# Patient Record
Sex: FEMALE | Race: BLACK OR AFRICAN AMERICAN | NOT HISPANIC OR LATINO | Employment: UNEMPLOYED | URBAN - METROPOLITAN AREA
[De-identification: names, ages, dates, MRNs, and addresses within clinical notes are randomized per-mention and may not be internally consistent; named-entity substitution may affect disease eponyms.]

---

## 2017-07-24 ENCOUNTER — HOSPITAL ENCOUNTER (EMERGENCY)
Facility: HOSPITAL | Age: 27
Discharge: HOME | End: 2017-07-24
Attending: EMERGENCY MEDICINE
Payer: MEDICAID

## 2017-07-24 VITALS
BODY MASS INDEX: 26.68 KG/M2 | HEIGHT: 67 IN | SYSTOLIC BLOOD PRESSURE: 110 MMHG | HEART RATE: 90 BPM | WEIGHT: 170 LBS | OXYGEN SATURATION: 100 % | RESPIRATION RATE: 18 BRPM | TEMPERATURE: 98 F | DIASTOLIC BLOOD PRESSURE: 62 MMHG

## 2017-07-24 DIAGNOSIS — S40.012A CONTUSION OF LEFT SHOULDER, INITIAL ENCOUNTER: ICD-10-CM

## 2017-07-24 DIAGNOSIS — S09.90XA HEAD INJURY DUE TO TRAUMA, INITIAL ENCOUNTER: Primary | ICD-10-CM

## 2017-07-24 DIAGNOSIS — R51.9 OCCIPITAL HEADACHE: ICD-10-CM

## 2017-07-24 DIAGNOSIS — S20.229A BACK CONTUSION, UNSPECIFIED LATERALITY, INITIAL ENCOUNTER: ICD-10-CM
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
## 2017-07-24 LAB
B-HCG UR QL: NEGATIVE
CTP QC/QA: YES

## 2017-07-24 PROCEDURE — 25000003 PHARM REV CODE 250: Performed by: PHYSICIAN ASSISTANT

## 2017-07-24 PROCEDURE — 99285 EMERGENCY DEPT VISIT HI MDM: CPT | Mod: 25

## 2017-07-24 PROCEDURE — 81025 URINE PREGNANCY TEST: CPT | Performed by: PHYSICIAN ASSISTANT

## 2017-07-24 RX ORDER — ACETAMINOPHEN 325 MG/1
650 TABLET ORAL
Status: DISCONTINUED | OUTPATIENT
Start: 2017-07-24 | End: 2017-07-24

## 2017-07-24 RX ORDER — MELOXICAM 7.5 MG/1
7.5 TABLET ORAL DAILY
Qty: 12 TABLET | Refills: 0 | Status: SHIPPED | OUTPATIENT
Start: 2017-07-24 | End: 2018-02-13

## 2017-07-24 RX ORDER — HYDROCODONE BITARTRATE AND ACETAMINOPHEN 5; 325 MG/1; MG/1
1 TABLET ORAL
Status: COMPLETED | OUTPATIENT
Start: 2017-07-24 | End: 2017-07-24

## 2017-07-24 RX ORDER — ORPHENADRINE CITRATE 100 MG/1
100 TABLET, EXTENDED RELEASE ORAL 2 TIMES DAILY
Qty: 8 TABLET | Refills: 0 | Status: SHIPPED | OUTPATIENT
Start: 2017-07-24 | End: 2017-07-28

## 2017-07-24 RX ORDER — LIDOCAINE 50 MG/G
1 PATCH TOPICAL DAILY
Qty: 6 PATCH | Refills: 0 | Status: SHIPPED | OUTPATIENT
Start: 2017-07-24 | End: 2018-02-13

## 2017-07-24 RX ADMIN — HYDROCODONE BITARTRATE AND ACETAMINOPHEN 1 TABLET: 5; 325 TABLET ORAL at 02:07

## 2017-07-24 NOTE — ED TRIAGE NOTES
Patient c/o headache, neck, back, shoulder pain after tripping and falling down 13 steps at 430 am. Patient states she passed out for a minute.

## 2017-07-24 NOTE — ED PROVIDER NOTES
"Encounter Date: 7/24/2017    SCRIBE #1 NOTE: I, Naomi Jain, am scribing for, and in the presence of,  Brando Cuenca PA-C. I have scribed the following portions of the note - Other sections scribed: HPI/ROS.       History     Chief Complaint   Patient presents with    Fall     pt reports falling down flight of stairs "about 13 steps",at 0430, reports back pain 9/10, denies LOC     CC: Fall    HPI: This 27 y.o. Female with a medical history of asthma presents to the ED via EMS c/o severe (9/10), constant middle back pain, left shoulder pain, nausea, dizziness, generalized weakness, and possible LOC secondary to a fall that happened around 4:30 AM today. Patient reports that she was going up a flight of stairs, slipped, and fell backwards going down 13 wood steps. She notes that she does not remember what happened, only what her aunt told her. She then went to bed "not thinking anything of it," but woke up in pain later today. She notes that she hit the back of her head when falling. Back pain is exacerbated with palpation. Patient denies being pushed off steps. No attempted tx PTA. No alleviating factors reported. She also denies SOB, cough, visual disturbance, vomiting, and diarrhea as well as alcohol/drug use.       The history is provided by the patient. No  was used.     Review of patient's allergies indicates:  No Known Allergies  Past Medical History:   Diagnosis Date    Asthma      History reviewed. No pertinent surgical history.  History reviewed. No pertinent family history.  Social History   Substance Use Topics    Smoking status: Never Smoker    Smokeless tobacco: Never Used    Alcohol use No     Review of Systems   Constitutional: Negative for chills and fever.   HENT: Negative for ear pain.    Eyes: Negative for pain.   Respiratory: Negative for cough and shortness of breath.    Gastrointestinal: Positive for nausea. Negative for abdominal pain, diarrhea and vomiting. "   Genitourinary: Negative for difficulty urinating.   Musculoskeletal: Positive for back pain (middle). Negative for neck pain.        (+) Left Shoulder Pain   Skin: Negative for wound.   Neurological: Positive for dizziness and weakness (generalized).       Physical Exam     Initial Vitals [07/24/17 1306]   BP Pulse Resp Temp SpO2   (!) 110/51 94 18 98.5 °F (36.9 °C) 100 %      MAP       70.67         Physical Exam    Nursing note and vitals reviewed.  Constitutional: She appears well-developed and well-nourished. She is not diaphoretic. No distress.   HENT:   Head: Normocephalic and atraumatic. Head is without raccoon's eyes, without abrasion, without contusion and without laceration.   Right Ear: External ear normal. No hemotympanum.   Left Ear: External ear normal. No hemotympanum.   Nose: Nose normal. No nasal deformity.   Mouth/Throat: Oropharynx is clear and moist. No lacerations.   Eyes: Conjunctivae and EOM are normal. Right eye exhibits no discharge. Left eye exhibits no discharge.   Neck: Normal range of motion. No tracheal deviation present. No JVD present.   Cardiovascular: Normal rate, regular rhythm and normal heart sounds. Exam reveals no friction rub.    No murmur heard.  Pulmonary/Chest: Breath sounds normal. No stridor. No respiratory distress. She has no decreased breath sounds. She has no wheezes. She has no rhonchi. She has no rales. She exhibits no tenderness.   Abdominal: Soft. She exhibits no distension. There is no tenderness. There is no rigidity, no rebound and no guarding.   Musculoskeletal: Normal range of motion.   Diffuse tenderness to palpation of the entire neck and back, including to the midline spine and musculature.  However, there are no abnormal changes to the skin, including for abrasions, bruising, or lacerations.  No tenderness to the hips or right shoulder.  There is mild tenderness to palpation over the left before meals with limited range of motion of the left shoulder.   No tenderness or deformities to the joints.  Ambulating well, without limp.  No tenderness to the ribs.   Neurological: She is alert and oriented to person, place, and time.   Skin: Skin is warm and dry. No rash and no abscess noted. No erythema. No pallor.         ED Course   Procedures  Labs Reviewed   POCT URINE PREGNANCY             Medical Decision Making:   History:   Old Medical Records: I decided to obtain old medical records.    This is an emergent evaluation of a 27 y.o. female with no PMHx presenting to the ED for possible LOC s/p fall with back pain, HA, and L shoulder pain. Denies emesis, abdominal pain, and urinary retention. Vitals WNL, afebrile. Patient is non-toxic appearing and in no acute distress. Diffuse back TTP without visible evidence of trauma. Neurologically intact without TTP.     Given [place] in ED with improvement of symptoms. Remains well appearing. Xray of neck/spine and L shoulder shows no acute fracture/dislocation. CT head shows in intracranial hemorrhage or skull fracture. I doubt PTX. No abdominal TTP to suggest intraabdominal bleeding. Likely has soft tissue contusion.     Discharged home with supportive care. Head injury precautions given. Instructed to follow up with PCP for reevaluation and management of symptoms.     I discussed with the patient the diagnosis, treatment plan, indications for return to the emergency department, and for expected follow-up. The patient verbalized an understanding. The patient is asked if there are any questions or concerns. We discuss the case, until all issues are addressed to the patients satisfaction. Patient understands and is agreeable to the plan.     I discussed this patient with Dr. Russo who is in agreement with my assessment and plan.          Scribe Attestation:   Scribe #1: I performed the above scribed service and the documentation accurately describes the services I performed. I attest to the accuracy of the note.    Attending  Attestation:           Physician Attestation for Scribe:  Physician Attestation Statement for Scribe #1: I, Brando Cuenca PA-C, reviewed documentation, as scribed by Naomi Jain in my presence, and it is both accurate and complete.                 ED Course     Clinical Impression:   The primary encounter diagnosis was Head injury due to trauma, initial encounter. Diagnoses of Fall, Back contusion, unspecified laterality, initial encounter, Contusion of left shoulder, initial encounter, and Occipital headache were also pertinent to this visit.    Disposition:   Disposition: Discharged  Condition: Stable                        Brando Cuenca PA-C  07/24/17 1707

## 2017-08-08 ENCOUNTER — HOSPITAL ENCOUNTER (EMERGENCY)
Facility: HOSPITAL | Age: 27
Discharge: PSYCHIATRIC OR SUBSTANCE USE FACILITY | End: 2017-08-09
Attending: EMERGENCY MEDICINE
Payer: MEDICAID

## 2017-08-08 DIAGNOSIS — F39 MOOD DISORDER: Primary | ICD-10-CM

## 2017-08-08 DIAGNOSIS — F99 PSYCHIATRIC DIAGNOSIS: ICD-10-CM

## 2017-08-08 LAB
ALBUMIN SERPL BCP-MCNC: 3.5 G/DL
ALP SERPL-CCNC: 102 U/L
ALT SERPL W/O P-5'-P-CCNC: 24 U/L
AMORPH CRY URNS QL MICRO: ABNORMAL
AMPHET+METHAMPHET UR QL: NEGATIVE
ANION GAP SERPL CALC-SCNC: 11 MMOL/L
APAP SERPL-MCNC: <3 UG/ML
AST SERPL-CCNC: 37 U/L
B-HCG UR QL: NEGATIVE
BACTERIA #/AREA URNS HPF: ABNORMAL /HPF
BARBITURATES UR QL SCN>200 NG/ML: NEGATIVE
BASOPHILS # BLD AUTO: 0.04 K/UL
BASOPHILS NFR BLD: 0.3 %
BENZODIAZ UR QL SCN>200 NG/ML: NEGATIVE
BILIRUB SERPL-MCNC: 0.8 MG/DL
BILIRUB UR QL STRIP: ABNORMAL
BUN SERPL-MCNC: 8 MG/DL
BZE UR QL SCN: NEGATIVE
CALCIUM SERPL-MCNC: 9.3 MG/DL
CANNABINOIDS UR QL SCN: NEGATIVE
CHLORIDE SERPL-SCNC: 99 MMOL/L
CLARITY UR: ABNORMAL
CO2 SERPL-SCNC: 26 MMOL/L
COLOR UR: ABNORMAL
CREAT SERPL-MCNC: 0.7 MG/DL
CREAT UR-MCNC: 419.8 MG/DL
CTP QC/QA: YES
DIFFERENTIAL METHOD: ABNORMAL
EOSINOPHIL # BLD AUTO: 0.1 K/UL
EOSINOPHIL NFR BLD: 0.5 %
ERYTHROCYTE [DISTWIDTH] IN BLOOD BY AUTOMATED COUNT: 14.2 %
EST. GFR  (AFRICAN AMERICAN): >60 ML/MIN/1.73 M^2
EST. GFR  (NON AFRICAN AMERICAN): >60 ML/MIN/1.73 M^2
ETHANOL SERPL-MCNC: <10 MG/DL
GLUCOSE SERPL-MCNC: 94 MG/DL
GLUCOSE UR QL STRIP: ABNORMAL
HCT VFR BLD AUTO: 43.2 %
HGB BLD-MCNC: 14.1 G/DL
HGB UR QL STRIP: NEGATIVE
HYALINE CASTS #/AREA URNS LPF: 0 /LPF
KETONES UR QL STRIP: ABNORMAL
LEUKOCYTE ESTERASE UR QL STRIP: ABNORMAL
LYMPHOCYTES # BLD AUTO: 1.9 K/UL
LYMPHOCYTES NFR BLD: 16.6 %
MCH RBC QN AUTO: 29 PG
MCHC RBC AUTO-ENTMCNC: 32.6 G/DL
MCV RBC AUTO: 89 FL
METHADONE UR QL SCN>300 NG/ML: NEGATIVE
MICROSCOPIC COMMENT: ABNORMAL
MONOCYTES # BLD AUTO: 1 K/UL
MONOCYTES NFR BLD: 8.2 %
NEUTROPHILS # BLD AUTO: 8.7 K/UL
NEUTROPHILS NFR BLD: 74.4 %
NITRITE UR QL STRIP: NEGATIVE
NON-SQ EPI CELLS #/AREA URNS HPF: 1 /HPF
OPIATES UR QL SCN: NEGATIVE
PCP UR QL SCN>25 NG/ML: NEGATIVE
PH UR STRIP: 6 [PH] (ref 5–8)
PLATELET # BLD AUTO: 395 K/UL
PMV BLD AUTO: 9.2 FL
POTASSIUM SERPL-SCNC: 4.1 MMOL/L
PROT SERPL-MCNC: 8.5 G/DL
PROT UR QL STRIP: ABNORMAL
RBC # BLD AUTO: 4.87 M/UL
RBC #/AREA URNS HPF: 1 /HPF (ref 0–4)
SODIUM SERPL-SCNC: 136 MMOL/L
SP GR UR STRIP: 1.03 (ref 1–1.03)
SQUAMOUS #/AREA URNS HPF: 20 /HPF
TOXICOLOGY INFORMATION: ABNORMAL
TSH SERPL DL<=0.005 MIU/L-ACNC: 0.9 UIU/ML
URN SPEC COLLECT METH UR: ABNORMAL
UROBILINOGEN UR STRIP-ACNC: NEGATIVE EU/DL
WBC # BLD AUTO: 11.67 K/UL
WBC #/AREA URNS HPF: 3 /HPF (ref 0–5)

## 2017-08-08 PROCEDURE — 81000 URINALYSIS NONAUTO W/SCOPE: CPT

## 2017-08-08 PROCEDURE — 90792 PSYCH DIAG EVAL W/MED SRVCS: CPT | Mod: AF,HB,S$PBB, | Performed by: PSYCHIATRY & NEUROLOGY

## 2017-08-08 PROCEDURE — 85025 COMPLETE CBC W/AUTO DIFF WBC: CPT

## 2017-08-08 PROCEDURE — 81025 URINE PREGNANCY TEST: CPT | Performed by: EMERGENCY MEDICINE

## 2017-08-08 PROCEDURE — 99285 EMERGENCY DEPT VISIT HI MDM: CPT | Mod: 25

## 2017-08-08 PROCEDURE — 93010 ELECTROCARDIOGRAM REPORT: CPT | Mod: ,,, | Performed by: INTERNAL MEDICINE

## 2017-08-08 PROCEDURE — 80307 DRUG TEST PRSMV CHEM ANLYZR: CPT

## 2017-08-08 PROCEDURE — 84443 ASSAY THYROID STIM HORMONE: CPT

## 2017-08-08 PROCEDURE — 80329 ANALGESICS NON-OPIOID 1 OR 2: CPT

## 2017-08-08 PROCEDURE — 80320 DRUG SCREEN QUANTALCOHOLS: CPT

## 2017-08-08 PROCEDURE — 93005 ELECTROCARDIOGRAM TRACING: CPT

## 2017-08-08 PROCEDURE — 25000003 PHARM REV CODE 250: Performed by: EMERGENCY MEDICINE

## 2017-08-08 PROCEDURE — 80053 COMPREHEN METABOLIC PANEL: CPT

## 2017-08-08 RX ORDER — LORAZEPAM 0.5 MG/1
2 TABLET ORAL
Status: COMPLETED | OUTPATIENT
Start: 2017-08-08 | End: 2017-08-08

## 2017-08-08 RX ADMIN — LORAZEPAM 2 MG: 0.5 TABLET ORAL at 03:08

## 2017-08-08 NOTE — ED PROVIDER NOTES
"Encounter Date: 8/8/2017    SCRIBE #1 NOTE: I, Naomi Jain, am scribing for, and in the presence of,  Chucky Duran III, MD. I have scribed the following portions of the note - Other sections scribed: HPI/ROS.       History     Chief Complaint   Patient presents with    Anxiety     arrived [facility], called for c/o anxiety     CC: Anxiety    HPI: This 27 y.o. Female with a medical history of asthma presents to the ED via [facility] for an urgent evaluation of acute anxiety. Patient states that she woke up this morning feeling anxious with associated depression. She states that she feels "drained" and fatigue. Patient reports that she has always had things going on in her life, but there are "underlying things that make it worse" which is causing her stress. Patient is also c/o chest tightness ("can't breathe"), left arm numbness, and mild neck discomfort. No attempted tx for symptoms. No alleviating factors. Patient denies chest pain, fever, chills, and any other medical health issues.       The history is provided by the patient. No  was used.     Review of patient's allergies indicates:  No Known Allergies  Past Medical History:   Diagnosis Date    Asthma     Hx of psychiatric care     Psychiatric problem     Suicide attempt     Therapy      History reviewed. No pertinent surgical history.  History reviewed. No pertinent family history.  Social History   Substance Use Topics    Smoking status: Current Every Day Smoker     Packs/day: 1.00    Smokeless tobacco: Never Used    Alcohol use Yes      Comment: reports drinking daily states " it depends on how much"     Review of Systems   Constitutional: Positive for fatigue. Negative for chills and fever.   HENT: Negative for congestion, ear pain, rhinorrhea and sore throat.    Eyes: Negative for pain and visual disturbance.   Respiratory: Positive for chest tightness. Negative for cough and shortness of breath.    Cardiovascular: " Negative for chest pain.   Gastrointestinal: Negative for abdominal pain, diarrhea, nausea and vomiting.   Genitourinary: Negative for dysuria.   Musculoskeletal: Negative for back pain and neck pain.        (+) Neck discomfort   Skin: Negative for rash.   Neurological: Positive for numbness (left arm). Negative for weakness and headaches.   Psychiatric/Behavioral: The patient is nervous/anxious.        Physical Exam     Initial Vitals [08/08/17 1319]   BP Pulse Resp Temp SpO2   122/76 106 20 98.5 °F (36.9 °C) 100 %      MAP       91.33         Physical Exam    Nursing note and vitals reviewed.  Constitutional: She appears well-developed and well-nourished. She is not diaphoretic. No distress.   HENT:   Head: Normocephalic and atraumatic.   Nose: Nose normal.   Mouth/Throat: Oropharynx is clear and moist. No oropharyngeal exudate.   Eyes: Conjunctivae and EOM are normal. Pupils are equal, round, and reactive to light. No scleral icterus.   Neck: Normal range of motion. Neck supple. No thyromegaly present. No tracheal deviation present.   Cardiovascular: Normal rate, regular rhythm and normal heart sounds. Exam reveals no gallop and no friction rub.    No murmur heard.  Pulmonary/Chest: Breath sounds normal. No respiratory distress. She has no wheezes. She has no rhonchi. She has no rales.   Abdominal: Soft. Bowel sounds are normal. She exhibits no distension and no mass. There is no tenderness. There is no rebound and no guarding.   Musculoskeletal: Normal range of motion. She exhibits no edema or tenderness.   Lymphadenopathy:     She has no cervical adenopathy.   Neurological: She is alert and oriented to person, place, and time. She has normal strength. No cranial nerve deficit or sensory deficit.   Skin: Skin is warm and dry. No rash noted. No erythema. No pallor.   Psychiatric: Her behavior is normal.   Tearful         ED Course   Procedures  Labs Reviewed   CBC W/ AUTO DIFFERENTIAL - Abnormal; Notable for the  following:        Result Value    Platelets 395 (*)     Gran # 8.7 (*)     Gran% 74.4 (*)     Lymph% 16.6 (*)     All other components within normal limits   COMPREHENSIVE METABOLIC PANEL - Abnormal; Notable for the following:     Total Protein 8.5 (*)     All other components within normal limits   URINALYSIS - Abnormal; Notable for the following:     Appearance, UA Cloudy (*)     Protein, UA 2+ (*)     Glucose, UA 1+ (*)     Ketones, UA 2+ (*)     Bilirubin (UA) 1+ (*)     Leukocytes, UA Trace (*)     All other components within normal limits   DRUG SCREEN PANEL, URINE EMERGENCY - Abnormal; Notable for the following:     Creatinine, Random Ur 419.8 (*)     All other components within normal limits   ACETAMINOPHEN LEVEL - Abnormal; Notable for the following:     Acetaminophen (Tylenol), Serum <3.0 (*)     All other components within normal limits   URINALYSIS MICROSCOPIC - Abnormal; Notable for the following:     Bacteria, UA Few (*)     Non-Squam Epith 1 (*)     All other components within normal limits   TSH   ALCOHOL,MEDICAL (ETHANOL)   POCT URINE PREGNANCY             Medical Decision Making:   Initial Assessment:   27-year-old female presents for evaluation of anxiety/depression.  Patient's family members have called the emergency department and stated that the patient has been making homicidal and suicidal threats on Facebook, that she is been awake for 3 days.  On exam the patient is tearful but otherwise normal.   Differential Diagnosis:   Will obtain medical screening evaluation and plan for clearance for psychiatric evaluation and treatment.  ED Management:  Patient's workup is unremarkable.  Dr. Stewart has seen the patient and agrees with placement on a physician's emergency certificate.    At this time patient is cleared for psychiatric evaluation and treatment.            Scribe Attestation:   Scribe #1: I performed the above scribed service and the documentation accurately describes the services I  performed. I attest to the accuracy of the note.    Attending Attestation:           Physician Attestation for Scribe:  Physician Attestation Statement for Scribe #1: I, Chucky Duran III, MD, reviewed documentation, as scribed by Naomi Jain in my presence, and it is both accurate and complete.                 ED Course     Clinical Impression:   The primary encounter diagnosis was Mood disorder. A diagnosis of Psychiatric diagnosis was also pertinent to this visit.                           Chucky Duran III, MD  08/08/17 1884

## 2017-08-08 NOTE — CONSULTS
"[facility]  Psychiatry  Consult Note    Patient Name: Almaz Mora  MRN: [id number]   Code Status: No Order  Admission Date: 8/8/2017  Hospital Length of Stay: 0 days  Attending Physician: Chucky Duran III, MD  Primary Care Provider: [facility]    Current Legal Status: [facility]    Patient information was obtained from patient and ER records.   Consults  Subjective:     Principal Problem:<principal problem not specified>    Chief Complaint:  Mood disorder and anxiety     HPI: Patient Almaz Mora presents to the ED with chest pain and arm numbness and anxiety.  She states that she asked her aunt to call 911 to have her brought to the ED.  During our conversation, she is noted to be tearful, poor eye contact, and quiet.  She states that she has been very stressed at home lately with her aunt and that she drinks vodka daily (pint to a fifth) to "get away".  She states that she has been having thoughts of not living so aunt took her gun away.  Previous attempt of suicide by overdose and cutting at wrists over a year ago but never sought help or hospitalization.  Has been going to outpatient [facility] for "couple years" to see Dr. Naylor.  Not sure of which medications she has taken in the past or which medications she is taking now.  States that she is non-compliant "because none of them worked".  She is able to only recall trying Zoloft in the recent past.  No upcoming appointments.  States that she is depressed, sad, tearful, loss of interest, loss of motivation.  Passive thoughts of not being alive (no plans of suicide).  Worrisome person.  Panic attacks lately with increased alcohol use.  Denies manic or psychotic history but does feel that "reality isn't right".  Sleep has been poor past few days with restless sleep, pacing floors.  Denies alcohol withdrawals or seizures.  Aunt also made statement that patient made threatening comments on Facebook.  When told this to " "patient, she says, "now that makes me want to hurt her".  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    Hospital Course: No notes on file         Patient History           Medical as of 8/8/2017     Past Medical History Date Comments Source    Asthma -- -- Provider    Hx of psychiatric care -- -- Provider    Psychiatric problem -- -- Provider    Suicide attempt -- -- Provider    Therapy -- -- Provider    Pertinent Negatives Date Noted Comments Source    History of psychiatric hospitalization 8/8/2017 -- Provider            Surgical as of 8/8/2017    Past Surgical History: Patient provided no pertinent surgical history.           Family as of 8/8/2017    **None**           Tobacco Use as of 8/8/2017     Smoking Status Smoking Start Date Smoking Quit Date Packs/day Years Used    Current Every Day Smoker -- -- 1.00 --    Types Comments Smokeless Tobacco Status Smokeless Tobacco Quit Date Source    -- -- Never Used -- Provider            Alcohol Use as of 8/8/2017     Alcohol Use Drinks/Week Alcohol/Week Comments Source    Yes -- -- reports drinking daily states " it depends on how much" Provider            Drug Use as of 8/8/2017     Drug Use Types Frequency Comments Source    No -- -- -- Provider            Sexual Activity as of 8/8/2017     Sexually Active Birth Control Partners Comments Source    Not Currently -- -- -- Provider            Activities of Daily Living as of 8/8/2017     Activities of Daily Living Question Response Comments Source    Patient feels they ought to cut down on drinking/drug use Yes -- Provider    Patient annoyed by others criticizing their drinking/drug use No -- Provider    Patient has felt bad or guilty about drinking/drug use Yes -- Provider    Patient has had a drink/used drugs as an eye opener in the AM No -- Provider            Social Documentation as of 8/8/2017    **None**           Occupational as of 8/8/2017     Occupation Employer Comments Source    none - " "used to do makeup in the mall -- -- Provider            Socioeconomic as of 8/8/2017     Marital Status Spouse Name Number of Children Years Education Preferred Language Ethnicity Race Source    Single -- 0 -- English /Black Black or  Provider         Pertinent History Q A Comments    as of 8/8/2017 Lives with      Place in Birth Order      Lives in home with aunt    Number of Siblings      Raised by      Legal Involvement      Childhood Trauma      Criminal History of      Financial Status unemployed     Highest Level of Education high school graduation     Does patient have access to a firearm? Yes owns a gun     Service No     Primary Leisure Activity other "drink"    Spirituality non-practicing        Review of patient's allergies indicates:  No Known Allergies    No current facility-administered medications on file prior to encounter.      Current Outpatient Prescriptions on File Prior to Encounter   Medication Sig    lidocaine (LIDODERM) 5 % Place 1 patch onto the skin once daily. Remove & Discard patch within 12 hours or as directed by MD. May use 4% formulation if more affordable for patient.    meloxicam (MOBIC) 7.5 MG tablet Take 1 tablet (7.5 mg total) by mouth once daily.     Psychotherapeutics     None        Review of Systems   Constitutional: Positive for activity change and appetite change.   Respiratory: Negative for shortness of breath.    Cardiovascular: Negative for chest pain.   Gastrointestinal: Negative for diarrhea and nausea.   Musculoskeletal: Negative for myalgias.   Psychiatric/Behavioral: Positive for dysphoric mood and sleep disturbance.     Objective:     Vital Signs (Most Recent):  Temp: 98.5 °F (36.9 °C) (08/08/17 1319)  Pulse: 106 (08/08/17 1319)  Resp: 20 (08/08/17 1319)  BP: 122/76 (08/08/17 1319)  SpO2: 100 % (08/08/17 1319) Vital Signs (24h Range):  Temp:  [98.5 °F (36.9 °C)] 98.5 °F (36.9 °C)  Pulse:  [106] 106  Resp:  [20] 20  SpO2:  " "[100 %] 100 %  BP: (122)/(76) 122/76     Height: 5' 7" (170.2 cm)  Weight: 77.1 kg (170 lb)  Body mass index is 26.63 kg/m².    No intake or output data in the 24 hours ending 08/08/17 1558    Physical Exam   Psychiatric:   EXAMINATION    CONSTITUTIONAL  General Appearance: hospital attire    MUSCULOSKELETAL  Muscle Strength and Tone: normal  Abnormal Involuntary Movements: none noted  Gait and Station: not observed    PSYCHIATRIC MENTAL STATUS EXAM   Level of Consciousness: awake and alert but sleepy  Orientation: name, place, date, situation  Grooming: fair  Psychomotor Behavior: slowed  Speech: normal rate, soft tone, decreased volume  Language: no abnormalities  Mood: "not good"  Affect: blunted  Thought Process: linear  Associations: intact  Thought Content: threats of harm to others and passive thoughts of not living; denies psychosis  Memory: intact to recent and remote  Attention: diminished  Fund of Knowledge: intact to conversation  Insight: poor  Judgment: poor              Significant Labs:   Last 24 Hours:   Recent Lab Results       08/08/17  1547 08/08/17  1435      Acetaminophen (Tylenol), Serum  <3.0  Comment:  Toxic Levels:  Adults (4 hr post-ingestion).........>150 ug/mL  Adults (12 hr post-ingestion)........>40 ug/mL  Peds (2 hr post-ingestion, liquid)...>225 ug/mL  (L)     Albumin  3.5     Alcohol, Medical, Serum  <10     Alkaline Phosphatase  102     ALT  24     Anion Gap  11     AST  37     Baso #  0.04     Basophil%  0.3     Total Bilirubin  0.8  Comment:  For infants and newborns, interpretation of results should be based  on gestational age, weight and in agreement with clinical  observations.  Premature Infant recommended reference ranges:  Up to 24 hours.............<8.0 mg/dL  Up to 48 hours............<12.0 mg/dL  3-5 days..................<15.0 mg/dL  6-29 days.................<15.0 mg/dL       BUN, Bld  8     Calcium  9.3     Chloride  99     CO2  26     Creatinine  0.7     " Differential Method  Automated     eGFR if   >60     eGFR if non   >60  Comment:  Calculation used to obtain the estimated glomerular filtration  rate (eGFR) is the CKD-EPI equation. Since race is unknown   in our information system, the eGFR values for   -American and Non--American patients are given   for each creatinine result.       Eos #  0.1     Eosinophil%  0.5     Glucose  94     Gran #  8.7(H)     Gran%  74.4(H)     Hematocrit  43.2     Hemoglobin  14.1     Lymph #  1.9     Lymph%  16.6(L)     MCH  29.0     MCHC  32.6     MCV  89     Mono #  1.0     Mono%  8.2     MPV  9.2     Platelets  395(H)     Potassium  4.1     Preg Test, Ur Negative      Total Protein  8.5(H)      Acceptable Yes      RBC  4.87     RDW  14.2     Sodium  136     TSH  0.902     WBC  11.67         All pertinent labs within the past 24 hours have been reviewed.    Significant Imaging: I have reviewed all pertinent imaging results/findings within the past 24 hours.    Assessment/Plan:     Mood disorder    Unable to get full history but likely an underlying mood disorder (major depression vs substance induced).  Regardless, patient is gravely disabled with thought of harm to family and thoughts of not being alive.  Agree with PEC and 1:1 sitter.  Notify  of commitment process.  Seek acute inpatient psychiatric facility when medically cleared.  Monitor for alcohol withdrawal but not likely.  Once she gets some rest and feels comfortable, she may be more open and disclose which stressors are affecting her.  For now, treatment for depression and anxiety is warranted.  Would do best if we could get a list of meds that she has taken.  Would recommend to start with SSRI, likely citalopram would be a good choice.  If not, may do well with venlafaxine ER.  Also, inpatient psych facility will need to coordinate with aunt that she does not have access to the gun.  Utilize lorazepam  1mg every 4 hours PRN acute non-redirectable agitation, in case it is related to her alcohol use or possible withdrawal.             Kalen Stewart MD   Psychiatry  [facility]

## 2017-08-08 NOTE — ED NOTES
DANIEL CALLED AND FAXED TO [facility] 'S OFFICE AT 3:15 PM  SPOKE WITH ROMMEL

## 2017-08-08 NOTE — ED TRIAGE NOTES
"Pt is a 28 y/o  female who arrived via [facility] for complaints of left arm pain and chest tightness. Pt reports the symptoms began " a couple of hours ago". Pt rates the pain as 8/10 at this time. Pt reports nonproductive cough that began earlier. Pt denies taking any medication to relieve the symptoms. Pt reports feeling anxious and states " I have never felt like this before". Pt denies SI or HI at present.   "

## 2017-08-08 NOTE — SUBJECTIVE & OBJECTIVE
"     Patient History           Medical as of 8/8/2017     Past Medical History Date Comments Source    Asthma -- -- Provider    Hx of psychiatric care -- -- Provider    Psychiatric problem -- -- Provider    Suicide attempt -- -- Provider    Therapy -- -- Provider    Pertinent Negatives Date Noted Comments Source    History of psychiatric hospitalization 8/8/2017 -- Provider            Surgical as of 8/8/2017    Past Surgical History: Patient provided no pertinent surgical history.           Family as of 8/8/2017    **None**           Tobacco Use as of 8/8/2017     Smoking Status Smoking Start Date Smoking Quit Date Packs/day Years Used    Current Every Day Smoker -- -- 1.00 --    Types Comments Smokeless Tobacco Status Smokeless Tobacco Quit Date Source    -- -- Never Used -- Provider            Alcohol Use as of 8/8/2017     Alcohol Use Drinks/Week Alcohol/Week Comments Source    Yes -- -- reports drinking daily states " it depends on how much" Provider            Drug Use as of 8/8/2017     Drug Use Types Frequency Comments Source    No -- -- -- Provider            Sexual Activity as of 8/8/2017     Sexually Active Birth Control Partners Comments Source    Not Currently -- -- -- Provider            Activities of Daily Living as of 8/8/2017     Activities of Daily Living Question Response Comments Source    Patient feels they ought to cut down on drinking/drug use Yes -- Provider    Patient annoyed by others criticizing their drinking/drug use No -- Provider    Patient has felt bad or guilty about drinking/drug use Yes -- Provider    Patient has had a drink/used drugs as an eye opener in the AM No -- Provider            Social Documentation as of 8/8/2017    **None**           Occupational as of 8/8/2017     Occupation Employer Comments Source    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 Yes owns a gun     Service No     Primary Leisure Activity other "drink"    Spirituality non-practicing        Review of patient's allergies indicates:  No Known Allergies    No current facility-administered medications on file prior to encounter.      Current Outpatient Prescriptions on File Prior to Encounter   Medication Sig    lidocaine (LIDODERM) 5 % Place 1 patch onto the skin once daily. Remove & Discard patch within 12 hours or as directed by MD. May use 4% formulation if more affordable for patient.    meloxicam (MOBIC) 7.5 MG tablet Take 1 tablet (7.5 mg total) by mouth once daily.     Psychotherapeutics     None        Review of Systems   Constitutional: Positive for activity change and appetite change.   Respiratory: Negative for shortness of breath.    Cardiovascular: Negative for chest pain.   Gastrointestinal: Negative for diarrhea and nausea.   Musculoskeletal: Negative for myalgias.   Psychiatric/Behavioral: Positive for dysphoric mood and sleep disturbance.     Objective:     Vital Signs (Most Recent):  Temp: 98.5 °F (36.9 °C) (08/08/17 1319)  Pulse: 106 (08/08/17 1319)  Resp: 20 (08/08/17 1319)  BP: 122/76 (08/08/17 1319)  SpO2: 100 % (08/08/17 1319) Vital Signs (24h Range):  Temp:  [98.5 °F (36.9 °C)] 98.5 °F (36.9 °C)  Pulse:  [106] 106  Resp:  [20] 20  SpO2:  [100 %] 100 %  BP: (122)/(76) 122/76     Height: 5' 7" (170.2 cm)  Weight: 77.1 kg (170 lb)  Body mass index is 26.63 kg/m².    No intake or output data in the 24 " "hours ending 08/08/17 1558    Physical Exam   Psychiatric:   EXAMINATION    CONSTITUTIONAL  General Appearance: hospital attire    MUSCULOSKELETAL  Muscle Strength and Tone: normal  Abnormal Involuntary Movements: none noted  Gait and Station: not observed    PSYCHIATRIC MENTAL STATUS EXAM   Level of Consciousness: awake and alert but sleepy  Orientation: name, place, date, situation  Grooming: fair  Psychomotor Behavior: slowed  Speech: normal rate, soft tone, decreased volume  Language: no abnormalities  Mood: "not good"  Affect: blunted  Thought Process: linear  Associations: intact  Thought Content: threats of harm to others and passive thoughts of not living; denies psychosis  Memory: intact to recent and remote  Attention: diminished  Fund of Knowledge: intact to conversation  Insight: poor  Judgment: poor              Significant Labs:   Last 24 Hours:   Recent Lab Results       08/08/17  1547 08/08/17  1435      Acetaminophen (Tylenol), Serum  <3.0  Comment:  Toxic Levels:  Adults (4 hr post-ingestion).........>150 ug/mL  Adults (12 hr post-ingestion)........>40 ug/mL  Peds (2 hr post-ingestion, liquid)...>225 ug/mL  (L)     Albumin  3.5     Alcohol, Medical, Serum  <10     Alkaline Phosphatase  102     ALT  24     Anion Gap  11     AST  37     Baso #  0.04     Basophil%  0.3     Total Bilirubin  0.8  Comment:  For infants and newborns, interpretation of results should be based  on gestational age, weight and in agreement with clinical  observations.  Premature Infant recommended reference ranges:  Up to 24 hours.............<8.0 mg/dL  Up to 48 hours............<12.0 mg/dL  3-5 days..................<15.0 mg/dL  6-29 days.................<15.0 mg/dL       BUN, Bld  8     Calcium  9.3     Chloride  99     CO2  26     Creatinine  0.7     Differential Method  Automated     eGFR if   >60     eGFR if non   >60  Comment:  Calculation used to obtain the estimated glomerular " filtration  rate (eGFR) is the CKD-EPI equation. Since race is unknown   in our information system, the eGFR values for   -American and Non--American patients are given   for each creatinine result.       Eos #  0.1     Eosinophil%  0.5     Glucose  94     Gran #  8.7(H)     Gran%  74.4(H)     Hematocrit  43.2     Hemoglobin  14.1     Lymph #  1.9     Lymph%  16.6(L)     MCH  29.0     MCHC  32.6     MCV  89     Mono #  1.0     Mono%  8.2     MPV  9.2     Platelets  395(H)     Potassium  4.1     Preg Test, Ur Negative      Total Protein  8.5(H)      Acceptable Yes      RBC  4.87     RDW  14.2     Sodium  136     TSH  0.902     WBC  11.67         All pertinent labs within the past 24 hours have been reviewed.    Significant Imaging: I have reviewed all pertinent imaging results/findings within the past 24 hours.

## 2017-08-08 NOTE — ED NOTES
Sitter has direct visualization of patient with door open, patient has been changed into paper scrubs and personal belongings have been removed and placed in a patient belonging bag.

## 2017-08-08 NOTE — ED NOTES
Patient's mother called stated her daughter has not slept in 3 days. She has  not been taking her meds and has been very paranoid.mother states patient has been calling police making false reports. Mother states she is homicidal been posting statements on face book. Mother ask that we call her or the Aunt that patients lives with if staff needs anymore information regarding patient's behavior.Mother Kuoxo-[phone] or Aunt Swiras-[phone]

## 2017-08-08 NOTE — ASSESSMENT & PLAN NOTE
Unable to get full history but likely an underlying mood disorder (major depression vs substance induced).  Regardless, patient is gravely disabled with thought of harm to family and thoughts of not being alive.  Agree with [facility] and 1:1 sitter.  Notify  of commitment process.  Seek acute inpatient psychiatric facility when medically cleared.  Monitor for alcohol withdrawal but not likely.  Once she gets some rest and feels comfortable, she may be more open and disclose which stressors are affecting her.  For now, treatment for depression and anxiety is warranted.  Would do best if we could get a list of meds that she has taken.  Would recommend to start with SSRI, likely citalopram would be a good choice.  If not, may do well with venlafaxine ER.  Also, inpatient psych facility will need to coordinate with aunt that she does not have access to the gun.  Utilize lorazepam 1mg every 4 hours PRN acute non-redirectable agitation, in case it is related to her alcohol use or possible withdrawal.

## 2017-08-09 VITALS
HEART RATE: 96 BPM | BODY MASS INDEX: 26.68 KG/M2 | DIASTOLIC BLOOD PRESSURE: 67 MMHG | RESPIRATION RATE: 16 BRPM | SYSTOLIC BLOOD PRESSURE: 116 MMHG | OXYGEN SATURATION: 100 % | WEIGHT: 170 LBS | TEMPERATURE: 98 F | HEIGHT: 67 IN

## 2017-10-11 ENCOUNTER — HOSPITAL ENCOUNTER (EMERGENCY)
Facility: HOSPITAL | Age: 27
Discharge: HOME | End: 2017-10-11
Attending: EMERGENCY MEDICINE

## 2017-10-11 VITALS
HEIGHT: 67 IN | WEIGHT: 170 LBS | SYSTOLIC BLOOD PRESSURE: 105 MMHG | DIASTOLIC BLOOD PRESSURE: 64 MMHG | HEART RATE: 92 BPM | TEMPERATURE: 98 F | RESPIRATION RATE: 18 BRPM | BODY MASS INDEX: 26.68 KG/M2 | OXYGEN SATURATION: 99 %

## 2017-10-11 DIAGNOSIS — T50.901A ACCIDENTAL DRUG OVERDOSE, INITIAL ENCOUNTER: Primary | ICD-10-CM

## 2017-10-11 PROCEDURE — 99282 EMERGENCY DEPT VISIT SF MDM: CPT

## 2017-10-11 RX ORDER — QUETIAPINE FUMARATE 100 MG/1
TABLET, FILM COATED ORAL
COMMUNITY
End: 2019-01-17 | Stop reason: ALTCHOICE

## 2017-10-11 NOTE — ED TRIAGE NOTES
"Pt arrived via [facility] from home. CC of psych evaluation. Pt stated "I was on the phone with my cousin and I had taken 3 Seroquel and she just called 911." Pt presents drowsy, stated that she had "a few" drinks last night, pt denies SI, but reports she has had SI in the past and is dx with bipolar. NAD at this time.  "

## 2017-10-11 NOTE — ED PROVIDER NOTES
Encounter Date: 10/11/2017    SCRIBE #1 NOTE: I, Priya Hoover, am scribing for, and in the presence of,  Chucky Duran III, MD. I have scribed the following portions of the note - Other sections scribed: HPI and ROS.       History     Chief Complaint   Patient presents with    Psychiatric Evaluation     arrived via [facility], called to home by pt's cousin, not on scene, reports pt took 4-5 seroquel last night and hinted on possbile SIEDLY pt denies SI/HI     CC: Psychiatric Evaluation    HPI: This 27 y.o. Female who has asthma, bipolar 1 disorder, and previous suicide attempt presents to the ED for a psychiatric evaluation.  Patient reports she was sent to the ED after her cousin became concerned after she stated she took an extra dose of her medication.  Patient reports taking a 100 mg tablet of Seroquel in attempts to obtain sleep.  She reports taking an extra dose of the Seroquel after she felt the initial dose was not adequate.  She states she was informed by her doctor that she can increase her dose if needed.  Patient reports she had no intention of harming herself.  Patient denies fever, chills, nausea, emesis, diarrhea, abdominal pain, chest pain, back pain, shortness of breath, suicidal ideation, or any other associated symptoms. No prior tx.  No alleviating factors.      The history is provided by the patient. No  was used.     Review of patient's allergies indicates:  No Known Allergies  Past Medical History:   Diagnosis Date    Asthma     Bipolar 1 disorder     Hx of psychiatric care     Psychiatric problem     Suicide attempt     Therapy      History reviewed. No pertinent surgical history.  History reviewed. No pertinent family history.  Social History   Substance Use Topics    Smoking status: Current Every Day Smoker     Packs/day: 1.00    Smokeless tobacco: Never Used    Alcohol use Yes      Comment: occassionally     Review of Systems   Constitutional: Negative for  chills and fever.   HENT: Negative for ear pain and sore throat.    Eyes: Negative for pain.   Respiratory: Negative for cough and shortness of breath.    Cardiovascular: Negative for chest pain.   Gastrointestinal: Negative for abdominal pain, diarrhea, nausea and vomiting.   Genitourinary: Negative for dysuria.   Musculoskeletal: Negative for back pain.        (-) arm or leg problems   Skin: Negative for rash.   Neurological: Negative for headaches.   Psychiatric/Behavioral: Negative for suicidal ideas.       Physical Exam     Initial Vitals [10/11/17 1014]   BP Pulse Resp Temp SpO2   102/63 102 18 98.1 °F (36.7 °C) 98 %      MAP       76         Physical Exam    Nursing note and vitals reviewed.  Constitutional: She appears well-developed and well-nourished. She is not diaphoretic. No distress.   HENT:   Head: Normocephalic and atraumatic.   Nose: Nose normal.   Mouth/Throat: Oropharynx is clear and moist. No oropharyngeal exudate.   Eyes: Conjunctivae and EOM are normal. Pupils are equal, round, and reactive to light. No scleral icterus.   Neck: Normal range of motion. Neck supple. No thyromegaly present. No tracheal deviation present.   Cardiovascular: Normal rate, regular rhythm and normal heart sounds. Exam reveals no gallop and no friction rub.    No murmur heard.  Pulmonary/Chest: Breath sounds normal. No respiratory distress. She has no wheezes. She has no rhonchi. She has no rales.   Abdominal: Soft. Bowel sounds are normal. She exhibits no distension and no mass. There is no tenderness. There is no rebound and no guarding.   Musculoskeletal: Normal range of motion. She exhibits no edema or tenderness.   Lymphadenopathy:     She has no cervical adenopathy.   Neurological: She is alert and oriented to person, place, and time. She has normal strength. No cranial nerve deficit or sensory deficit.   Skin: Skin is warm and dry. No rash noted. No erythema. No pallor.   Psychiatric: She has a normal mood and  affect. Her behavior is normal. Thought content normal.         ED Course   Procedures  Labs Reviewed   POCT URINE PREGNANCY             Medical Decision Making:   Initial Assessment:   27-year-old female with a history of bipolar disorder presents after taking an extra dose of Seroquel, with other details as above.  Physical examination essentially unremarkable.  Patient has no evidence of untoward effect of this double dosing.  She is smiling, with normal affect, no evidence of suicidal ideation.  She is hopeful, has good coping mechanisms and a good support system.  I do not believe she is truly a risk to herself in the colon I do not believe she requires placement any physicians emergency certificate for emergent psychiatric evaluation and treatment.            Scribe Attestation:   Scribe #1: I performed the above scribed service and the documentation accurately describes the services I performed. I attest to the accuracy of the note.    Attending Attestation:           Physician Attestation for Scribe:  Physician Attestation Statement for Scribe #1: I, Chucky Duran III, MD, reviewed documentation, as scribed by Priya Hoover in my presence, and it is both accurate and complete.                 ED Course      Clinical Impression:   The encounter diagnosis was Accidental drug overdose, initial encounter.                           Chucky Duran III, MD  10/18/17 0636

## 2017-10-11 NOTE — DISCHARGE INSTRUCTIONS
Please return to the emergency department if you develop worsening sleepiness, vomiting, severe dizziness, severe depression, severe anxiety, or for any new or worsening medical concerns.

## 2018-02-13 ENCOUNTER — HOSPITAL ENCOUNTER (EMERGENCY)
Facility: HOSPITAL | Age: 28
Discharge: HOME | End: 2018-02-14
Attending: EMERGENCY MEDICINE
Payer: MEDICAID

## 2018-02-13 DIAGNOSIS — N76.0 BV (BACTERIAL VAGINOSIS): Primary | ICD-10-CM

## 2018-02-13 DIAGNOSIS — B96.89 BV (BACTERIAL VAGINOSIS): Primary | ICD-10-CM

## 2018-02-13 DIAGNOSIS — R10.2 SUPRAPUBIC PAIN: ICD-10-CM

## 2018-02-13 LAB
B-HCG UR QL: NEGATIVE
BILIRUB UR QL STRIP: NEGATIVE
CLARITY UR: CLEAR
COLOR UR: NORMAL
CTP QC/QA: YES
GLUCOSE UR QL STRIP: NEGATIVE
HGB UR QL STRIP: NEGATIVE
KETONES UR QL STRIP: NEGATIVE
LEUKOCYTE ESTERASE UR QL STRIP: NEGATIVE
NITRITE UR QL STRIP: NEGATIVE
PH UR STRIP: 6 [PH] (ref 5–8)
PROT UR QL STRIP: NEGATIVE
SP GR UR STRIP: 1 (ref 1–1.03)
URN SPEC COLLECT METH UR: NORMAL
UROBILINOGEN UR STRIP-ACNC: NEGATIVE EU/DL

## 2018-02-13 PROCEDURE — 81003 URINALYSIS AUTO W/O SCOPE: CPT

## 2018-02-13 PROCEDURE — 99284 EMERGENCY DEPT VISIT MOD MDM: CPT | Mod: 25

## 2018-02-13 PROCEDURE — 81025 URINE PREGNANCY TEST: CPT | Performed by: PHYSICIAN ASSISTANT

## 2018-02-14 VITALS
OXYGEN SATURATION: 97 % | DIASTOLIC BLOOD PRESSURE: 77 MMHG | RESPIRATION RATE: 16 BRPM | HEIGHT: 67 IN | TEMPERATURE: 98 F | BODY MASS INDEX: 25.11 KG/M2 | HEART RATE: 84 BPM | SYSTOLIC BLOOD PRESSURE: 122 MMHG | WEIGHT: 160 LBS

## 2018-02-14 LAB
BACTERIA GENITAL QL WET PREP: ABNORMAL
C TRACH DNA SPEC QL NAA+PROBE: NOT DETECTED
CLUE CELLS VAG QL WET PREP: ABNORMAL
FILAMENT FUNGI VAG WET PREP-#/AREA: ABNORMAL
N GONORRHOEA DNA SPEC QL NAA+PROBE: NOT DETECTED
SPECIMEN SOURCE: ABNORMAL
T VAGINALIS GENITAL QL WET PREP: ABNORMAL
WBC #/AREA VAG WET PREP: ABNORMAL
YEAST GENITAL QL WET PREP: ABNORMAL

## 2018-02-14 PROCEDURE — 87210 SMEAR WET MOUNT SALINE/INK: CPT

## 2018-02-14 PROCEDURE — 87491 CHLMYD TRACH DNA AMP PROBE: CPT

## 2018-02-14 RX ORDER — METRONIDAZOLE 500 MG/1
500 TABLET ORAL EVERY 12 HOURS
Qty: 14 TABLET | Refills: 0 | Status: SHIPPED | OUTPATIENT
Start: 2018-02-14 | End: 2018-02-21

## 2018-02-14 NOTE — ED TRIAGE NOTES
Pt. Reports having abdominal pain for the last 3 days, pt. Reports the sensation feels like cramping, but not related to her menstruation because period just passed. Pt. Reports pain in the lower epigastric area, pt. Denies vomiting and diarrhea complains of some nausea.

## 2018-02-14 NOTE — ED PROVIDER NOTES
Encounter Date: 2/13/2018    SCRIBE #1 NOTE: I, Jareth Cr, am scribing for, and in the presence of, Sarmad Moreno NP. Other sections scribed: HPI, ROS.       History     Chief Complaint   Patient presents with    abdominal pain     lower abd pain x 2 days; today started with dysuria and does states she has a vaginal discharge (green and white);      CC: Abdominal Pain  HPI: This 27 y.o. female smoker with Hx of Bipolar 1 disorder and asthma presents to the ED c/o dysuria, severe suprapubic abdominal pain upon urination, vaginal discharge, and intermittent nausea for the past few days. Sx are acute. She denies fever, hematuria, emesis.        The history is provided by the patient.     Review of patient's allergies indicates:  No Known Allergies  Past Medical History:   Diagnosis Date    Asthma     Bipolar 1 disorder     Hx of psychiatric care     Psychiatric problem     Suicide attempt     Therapy      History reviewed. No pertinent surgical history.  History reviewed. No pertinent family history.  Social History   Substance Use Topics    Smoking status: Current Every Day Smoker     Packs/day: 0.00    Smokeless tobacco: Never Used      Comment: marijuana    Alcohol use Yes      Comment: occassionally     Review of Systems   Constitutional: Negative for fever.   HENT: Negative for rhinorrhea and sore throat.    Eyes: Negative for pain and visual disturbance.   Respiratory: Negative for cough and shortness of breath.    Cardiovascular: Negative for chest pain.   Gastrointestinal: Positive for abdominal pain and nausea. Negative for diarrhea and vomiting.   Genitourinary: Positive for dysuria and vaginal discharge. Negative for flank pain and hematuria.   Musculoskeletal: Negative for arthralgias and myalgias.   Skin: Negative for rash and wound.   Neurological: Negative for dizziness and headaches.       Physical Exam     Initial Vitals [02/13/18 2128]   BP Pulse Resp Temp SpO2   137/72 105 16 98.2 °F  (36.8 °C) 97 %      MAP       93.67         Physical Exam    Nursing note and vitals reviewed.  Constitutional: She appears well-developed and well-nourished. She is not diaphoretic. No distress.   HENT:   Head: Normocephalic and atraumatic.   Right Ear: External ear normal.   Left Ear: External ear normal.   Nose: Nose normal.   Eyes: Conjunctivae and EOM are normal. Pupils are equal, round, and reactive to light. Right eye exhibits no discharge. Left eye exhibits no discharge. No scleral icterus.   Neck: Normal range of motion. Neck supple. No thyromegaly present. No tracheal deviation present. No JVD present.   Cardiovascular: Normal rate, regular rhythm, normal heart sounds and intact distal pulses. Exam reveals no gallop and no friction rub.    No murmur heard.  Pulmonary/Chest: Breath sounds normal. No stridor. No respiratory distress. She has no wheezes. She has no rhonchi. She has no rales.   Abdominal: Soft. Bowel sounds are normal. She exhibits no distension and no mass. There is no tenderness. There is no rebound and no guarding.   Genitourinary: Pelvic exam was performed with patient supine. Uterus is tender. Uterus is not enlarged. Cervix exhibits no motion tenderness, no discharge and no friability. Right adnexum displays no mass, no tenderness and no fullness. Left adnexum displays no mass, no tenderness and no fullness. No erythema, tenderness or bleeding in the vagina. No foreign body in the vagina. Vaginal discharge found.   Genitourinary Comments: Moderate vaginal discharge with uterine tenderness to palpation. No CMT or adnexal tenderness   Musculoskeletal: Normal range of motion. She exhibits no edema or tenderness.   Lymphadenopathy:     She has no cervical adenopathy.   Neurological: She is alert and oriented to person, place, and time. She has normal strength and normal reflexes. She displays normal reflexes. No cranial nerve deficit or sensory deficit.   Skin: Skin is warm and dry. No rash  and no abscess noted. No erythema. No pallor.   Psychiatric: She has a normal mood and affect. Her behavior is normal. Judgment and thought content normal.         ED Course   Procedures  Labs Reviewed   VAGINAL SCREEN - Abnormal; Notable for the following:        Result Value    Clue Cells, Wet Prep Rare (*)     Bacteria - Vaginal Screen Rare (*)     All other components within normal limits   C. TRACHOMATIS/N. GONORRHOEAE BY AMP DNA   URINALYSIS   POCT URINE PREGNANCY             Medical Decision Making:   Differential Diagnosis:   Includes UTI, pregnancy, PID, vaginitis, others  Clinical Tests:   Lab Tests: Ordered and Reviewed  ED Management:  27-year-old female presenting for evaluation of suprapubic pain, dysuria, and vaginal discharge. She denies concern for STDs. She denies fevers or any additional symptoms. Patient is wearing a [employer] costume and smells of EtOH. She appears intoxicated. Abdomen is soft and nontender without rigidity or guarding. There is mild suprapubic tenderness to palpation. On vaginal exam there is a moderate amount of white discharge. The cervical os is closed. No CMT or adnexal tenderness to palpation. There is uterine tenderness to palpation. Urinalysis is unremarkable. Vaginal screen remarkable for clue cells and bacteria. Gonorrhea and chlamydia testing is in process. Prescribed Flagyl at discharge. Recommended follow-up with patient's OB/GYN. ED return precautions given. Patient expressed understanding of diagnosis and discharge instructions.     Shortly after my initial examination patient is stomping around the department shouting expletives. Security confronted the patient who agreed to return to her room quietly.  Other:   I have discussed this case with another health care provider.       <> Summary of the Discussion: Case discussed with my attending physician who agreed with the assessment and plan.            Scribe Attestation:   Scribe #1: I performed the above  scribed service and the documentation accurately describes the services I performed. I attest to the accuracy of the note.    Attending Attestation:           Physician Attestation for Scribe:  Physician Attestation Statement for Scribe #1: I, Sarmad Moreno NP, reviewed documentation, as scribed by Jareth Cr in my presence, and it is both accurate and complete.                 ED Course      Clinical Impression:   The primary encounter diagnosis was BV (bacterial vaginosis). A diagnosis of Suprapubic pain was also pertinent to this visit.    Disposition:   Disposition: Discharged  Condition: Stable                        Sarmad Moreno NP  02/14/18 020

## 2018-02-14 NOTE — ED NOTES
Pt returned to room with security. Explained plan of care to patient, states that she will stay for pelvic exam.

## 2018-02-14 NOTE — DISCHARGE INSTRUCTIONS
Take Flagyl twice daily for 7 days for your vaginal infection.    Follow-up with your OB/GYN for further evaluation and management.     Return to the emergency department for any new or worsening symptoms or as needed.

## 2018-02-14 NOTE — ED NOTES
"Pt walked out of room agitated, yelling and cursing, states to friend "I've been here three fucking hours, I'm in pain and I can go somewhere else." Pt left the q track and walked into the lobby.   "

## 2018-06-01 DIAGNOSIS — N91.2 AMENORRHEA: Primary | ICD-10-CM

## 2018-11-02 ENCOUNTER — HOSPITAL ENCOUNTER (EMERGENCY)
Facility: HOSPITAL | Age: 28
Discharge: HOME | End: 2018-11-03
Attending: EMERGENCY MEDICINE
Payer: MEDICAID

## 2018-11-02 DIAGNOSIS — T14.90XA TRAUMA: ICD-10-CM

## 2018-11-02 DIAGNOSIS — S39.012A STRAIN OF LUMBAR REGION, INITIAL ENCOUNTER: ICD-10-CM

## 2018-11-02 DIAGNOSIS — S00.81XA ABRASION OF FACE, INITIAL ENCOUNTER: ICD-10-CM
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
## 2018-11-02 DIAGNOSIS — S16.1XXA STRAIN OF NECK MUSCLE, INITIAL ENCOUNTER: ICD-10-CM

## 2018-11-02 DIAGNOSIS — S20.229A CONTUSION OF BACK, UNSPECIFIED LATERALITY, INITIAL ENCOUNTER: ICD-10-CM

## 2018-11-02 DIAGNOSIS — S40.011A CONTUSION OF MULTIPLE SITES OF RIGHT SHOULDER, INITIAL ENCOUNTER: ICD-10-CM

## 2018-11-02 LAB
ETHANOL SERPL-MCNC: 159 MG/DL
HCG INTACT+B SERPL-ACNC: <1.2 MIU/ML

## 2018-11-02 PROCEDURE — 80320 DRUG SCREEN QUANTALCOHOLS: CPT

## 2018-11-02 PROCEDURE — 63600175 PHARM REV CODE 636 W HCPCS: Performed by: EMERGENCY MEDICINE

## 2018-11-02 PROCEDURE — 96374 THER/PROPH/DIAG INJ IV PUSH: CPT

## 2018-11-02 PROCEDURE — 25000003 PHARM REV CODE 250: Performed by: EMERGENCY MEDICINE

## 2018-11-02 PROCEDURE — 99284 EMERGENCY DEPT VISIT MOD MDM: CPT | Mod: 25

## 2018-11-02 PROCEDURE — 84702 CHORIONIC GONADOTROPIN TEST: CPT

## 2018-11-02 RX ORDER — IBUPROFEN 400 MG/1
400 TABLET ORAL EVERY 6 HOURS PRN
Qty: 20 TABLET | Refills: 0 | Status: SHIPPED | OUTPATIENT
Start: 2018-11-02 | End: 2019-01-17 | Stop reason: ALTCHOICE

## 2018-11-02 RX ORDER — HYDROCODONE BITARTRATE AND ACETAMINOPHEN 5; 325 MG/1; MG/1
1 TABLET ORAL
Status: COMPLETED | OUTPATIENT
Start: 2018-11-02 | End: 2018-11-02

## 2018-11-02 RX ORDER — LORAZEPAM 0.5 MG/1
0.5 TABLET ORAL
Status: COMPLETED | OUTPATIENT
Start: 2018-11-02 | End: 2018-11-02

## 2018-11-02 RX ORDER — KETOROLAC TROMETHAMINE 30 MG/ML
15 INJECTION, SOLUTION INTRAMUSCULAR; INTRAVENOUS
Status: COMPLETED | OUTPATIENT
Start: 2018-11-02 | End: 2018-11-02

## 2018-11-02 RX ORDER — ACETAMINOPHEN AND CODEINE PHOSPHATE 300; 30 MG/1; MG/1
1 TABLET ORAL EVERY 6 HOURS PRN
Qty: 20 TABLET | Refills: 0 | Status: SHIPPED | OUTPATIENT
Start: 2018-11-02 | End: 2018-11-12

## 2018-11-02 RX ADMIN — LORAZEPAM 0.5 MG: 0.5 TABLET ORAL at 10:11

## 2018-11-02 RX ADMIN — KETOROLAC TROMETHAMINE 15 MG: 30 INJECTION, SOLUTION INTRAMUSCULAR at 11:11

## 2018-11-02 RX ADMIN — HYDROCODONE BITARTRATE AND ACETAMINOPHEN 1 TABLET: 5; 325 TABLET ORAL at 09:11

## 2018-11-02 RX ADMIN — FLUORESCEIN SODIUM 1 EACH: 1 STRIP OPHTHALMIC at 11:11

## 2018-11-03 VITALS
TEMPERATURE: 98 F | WEIGHT: 180 LBS | SYSTOLIC BLOOD PRESSURE: 123 MMHG | HEART RATE: 79 BPM | RESPIRATION RATE: 18 BRPM | BODY MASS INDEX: 30.73 KG/M2 | DIASTOLIC BLOOD PRESSURE: 80 MMHG | HEIGHT: 64 IN | OXYGEN SATURATION: 98 %

## 2018-11-03 NOTE — PROGRESS NOTES
Imaging restricted due to presence of C collar and limited ROM due to trauma. Pt states first day of LMP 10/29/2018.-LGF
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
## 2018-11-16 DIAGNOSIS — S39.012A STRAIN OF BACK: Primary | ICD-10-CM

## 2018-11-16 DIAGNOSIS — S16.1XXA CERVICAL STRAIN: ICD-10-CM

## 2018-12-11 ENCOUNTER — CLINICAL SUPPORT (OUTPATIENT)
Dept: REHABILITATION | Facility: HOSPITAL | Age: 28
End: 2018-12-11
Attending: NURSE PRACTITIONER
Payer: MEDICAID

## 2018-12-11 DIAGNOSIS — M62.81 MUSCLE WEAKNESS: ICD-10-CM

## 2018-12-11 DIAGNOSIS — M54.2 NECK PAIN: ICD-10-CM

## 2018-12-11 DIAGNOSIS — M25.511 RIGHT SHOULDER PAIN, UNSPECIFIED CHRONICITY: ICD-10-CM

## 2018-12-11 DIAGNOSIS — M25.611 DECREASED ROM OF RIGHT SHOULDER: ICD-10-CM

## 2018-12-11 PROCEDURE — 97110 THERAPEUTIC EXERCISES: CPT | Mod: PN

## 2018-12-11 PROCEDURE — 97161 PT EVAL LOW COMPLEX 20 MIN: CPT | Mod: PN

## 2018-12-11 PROCEDURE — 97140 MANUAL THERAPY 1/> REGIONS: CPT | Mod: PN
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
## 2018-12-18 ENCOUNTER — DOCUMENTATION ONLY (OUTPATIENT)
Dept: REHABILITATION | Facility: HOSPITAL | Age: 28
End: 2018-12-18

## 2018-12-28 ENCOUNTER — DOCUMENTATION ONLY (OUTPATIENT)
Dept: REHABILITATION | Facility: HOSPITAL | Age: 28
End: 2018-12-28

## 2019-05-24 PROBLEM — D21.9 FIBROID: Status: ACTIVE | Noted: 2019-05-24

## 2019-06-11 ENCOUNTER — HOSPITAL ENCOUNTER (OUTPATIENT)
Facility: HOSPITAL | Age: 29
Discharge: HOME | End: 2019-06-11
Attending: OBSTETRICS & GYNECOLOGY | Admitting: OBSTETRICS & GYNECOLOGY
Payer: MEDICAID

## 2019-06-11 VITALS
HEART RATE: 89 BPM | RESPIRATION RATE: 18 BRPM | DIASTOLIC BLOOD PRESSURE: 69 MMHG | SYSTOLIC BLOOD PRESSURE: 108 MMHG | OXYGEN SATURATION: 100 %

## 2019-06-11 DIAGNOSIS — O26.899 ABDOMINAL PAIN IN PREGNANCY: ICD-10-CM

## 2019-06-11 DIAGNOSIS — R10.9 ABDOMINAL PAIN IN PREGNANCY: ICD-10-CM

## 2019-06-11 PROCEDURE — 96360 HYDRATION IV INFUSION INIT: CPT

## 2019-06-11 PROCEDURE — 96361 HYDRATE IV INFUSION ADD-ON: CPT

## 2019-06-11 PROCEDURE — 96374 THER/PROPH/DIAG INJ IV PUSH: CPT

## 2019-06-11 PROCEDURE — 96375 TX/PRO/DX INJ NEW DRUG ADDON: CPT

## 2019-06-11 PROCEDURE — 99211 OFF/OP EST MAY X REQ PHY/QHP: CPT

## 2019-06-11 PROCEDURE — 63600175 PHARM REV CODE 636 W HCPCS: Performed by: OBSTETRICS & GYNECOLOGY

## 2019-06-11 PROCEDURE — 25000003 PHARM REV CODE 250: Performed by: OBSTETRICS & GYNECOLOGY

## 2019-06-11 RX ORDER — ACETAMINOPHEN 500 MG
500 TABLET ORAL EVERY 6 HOURS PRN
Status: DISCONTINUED | OUTPATIENT
Start: 2019-06-11 | End: 2019-06-11 | Stop reason: HOSPADM

## 2019-06-11 RX ORDER — SODIUM CHLORIDE, SODIUM LACTATE, POTASSIUM CHLORIDE, CALCIUM CHLORIDE 600; 310; 30; 20 MG/100ML; MG/100ML; MG/100ML; MG/100ML
INJECTION, SOLUTION INTRAVENOUS CONTINUOUS
Status: DISCONTINUED | OUTPATIENT
Start: 2019-06-11 | End: 2019-06-11 | Stop reason: HOSPADM

## 2019-06-11 RX ORDER — ONDANSETRON 8 MG/1
8 TABLET, ORALLY DISINTEGRATING ORAL EVERY 8 HOURS PRN
Status: DISCONTINUED | OUTPATIENT
Start: 2019-06-11 | End: 2019-06-11 | Stop reason: HOSPADM

## 2019-06-11 RX ADMIN — SODIUM CHLORIDE, SODIUM LACTATE, POTASSIUM CHLORIDE, AND CALCIUM CHLORIDE: .6; .31; .03; .02 INJECTION, SOLUTION INTRAVENOUS at 03:06

## 2019-06-11 RX ADMIN — PROMETHAZINE HYDROCHLORIDE 12.5 MG: 25 INJECTION INTRAMUSCULAR; INTRAVENOUS at 01:06

## 2019-06-11 RX ADMIN — SODIUM CHLORIDE, SODIUM LACTATE, POTASSIUM CHLORIDE, AND CALCIUM CHLORIDE 1000 ML: .6; .31; .03; .02 INJECTION, SOLUTION INTRAVENOUS at 12:06

## 2019-06-11 NOTE — TREATMENT PLAN
After the ice chips, pt reports not feeling so great anymore. States she is a little nauseous now.

## 2019-06-11 NOTE — TREATMENT PLAN
Pt to unit with c/o nausea/vomiting.  States she hasn't been able to keep anything down for 3 days. Reports having trouble with n/v in the beginning of the pregnancy, but it has been better for a while now.  Pt reports taking her last zofran odt around noon, and it did not really help.      Pt also reports she hadn't felt the baby move all day, but when she arrived at the hospital she felt him kick.

## 2019-06-11 NOTE — DISCHARGE INSTRUCTIONS
Home Undelivered Discharge Instructions    After Discharge Orders:    Future Appointments   Date Time Provider Department Center   6/14/2019  8:30 AM Taqueria Barakat MD [facility]           Current Discharge Medication List      CONTINUE these medications which have NOT CHANGED    Details   ciclopirox (LOPROX) 0.77 % Crea Apply topically 2 (two) times daily.  Qty: 30 g, Refills: 0      docusate sodium (COLACE) 100 MG capsule Take 1 capsule (100 mg total) by mouth daily as needed for Constipation.  Qty: 30 capsule, Refills: 1      gabapentin (NEURONTIN) 300 MG capsule TK ONE C PO  TID.  Refills: 0      hydrOXYzine HCl (ATARAX) 25 MG tablet TK 1 TO 2 TS PO QD PRN ANXIETY  Refills: 2      metroNIDAZOLE (FLAGYL) 500 MG tablet TAKE ONE TABLET BY MOUTH EVERY TWELVE HOURS for 7 days  Refills: 0      ondansetron (ZOFRAN ODT) 4 MG TbDL Take 1 tablet (4 mg total) by mouth every 6 (six) hours as needed.  Qty: 30 tablet, Refills: 5    Associated Diagnoses: Encounter for supervision of normal first pregnancy in first trimester      prenat.vits,reina,min-iron-folic (PRENATAL VITAMIN) Tab Take 1 tablet by mouth once daily. May substitute for a prenatal vitamin that her insurance will cover.  Qty: 90 each, Refills: 3                     · Diet:  normal diet as tolerated    · Rest: normal activity as tolerated        Call physician or midwife, return to Labor and Delivery, call 911, or go to the nearest Emergency Room if: increased leakage or fluid, decreased fetal movement, persistent low back pain or cramping, bleeding from vaginal area, difficulty urinating, pain with urination, difficulty breathing, new calf pain, persistent headache or vision change, contractions every 5min for 1 hour     DRINK 10-12 GLASSES OF WATER DAILY  MAY TAKE TYLENOL FOR PAIN    FOLLOW UP WITH NEXT SCHEDULED APPOINTMENT

## 2019-06-11 NOTE — TREATMENT PLAN
Now pt states she is not nauseous at all - given apple juice and crackers per her request.  Instructed to try juice first and then if tolerated she can try the crackers.

## 2019-06-14 PROBLEM — O99.012 ANEMIA AFFECTING PREGNANCY IN SECOND TRIMESTER: Status: ACTIVE | Noted: 2019-06-14

## 2019-06-14 PROBLEM — F12.10 MILD TETRAHYDROCANNABINOL (THC) ABUSE: Status: ACTIVE | Noted: 2019-06-14

## 2019-06-24 PROBLEM — O99.312 ALCOHOL ABUSE AFFECTING PREGNANCY IN SECOND TRIMESTER: Status: ACTIVE | Noted: 2019-06-24

## 2019-06-24 PROBLEM — F10.10 ALCOHOL ABUSE AFFECTING PREGNANCY IN SECOND TRIMESTER: Status: ACTIVE | Noted: 2019-06-24

## 2019-06-28 PROBLEM — O26.899 ABDOMINAL PAIN IN PREGNANCY: Status: RESOLVED | Noted: 2019-06-11 | Resolved: 2019-06-28

## 2019-06-28 PROBLEM — O99.312 ALCOHOL ABUSE AFFECTING PREGNANCY IN SECOND TRIMESTER: Status: RESOLVED | Noted: 2019-06-24 | Resolved: 2019-06-28

## 2019-06-28 PROBLEM — F12.10 MILD TETRAHYDROCANNABINOL (THC) ABUSE: Status: RESOLVED | Noted: 2019-06-14 | Resolved: 2019-06-28

## 2019-06-28 PROBLEM — F10.10 ALCOHOL ABUSE AFFECTING PREGNANCY IN SECOND TRIMESTER: Status: RESOLVED | Noted: 2019-06-24 | Resolved: 2019-06-28

## 2019-06-28 PROBLEM — R10.9 ABDOMINAL PAIN IN PREGNANCY: Status: RESOLVED | Noted: 2019-06-11 | Resolved: 2019-06-28

## 2019-07-03 ENCOUNTER — NURSE TRIAGE (OUTPATIENT)
Dept: ADMINISTRATIVE | Facility: CLINIC | Age: 29
End: 2019-07-03

## 2019-07-04 ENCOUNTER — HOSPITAL ENCOUNTER (EMERGENCY)
Facility: HOSPITAL | Age: 29
Discharge: HOME | End: 2019-07-04
Attending: EMERGENCY MEDICINE
Payer: MEDICAID

## 2019-07-04 VITALS
OXYGEN SATURATION: 98 % | WEIGHT: 170 LBS | SYSTOLIC BLOOD PRESSURE: 109 MMHG | DIASTOLIC BLOOD PRESSURE: 58 MMHG | TEMPERATURE: 99 F | HEIGHT: 67 IN | RESPIRATION RATE: 18 BRPM | BODY MASS INDEX: 26.68 KG/M2 | HEART RATE: 93 BPM

## 2019-07-04 DIAGNOSIS — Z71.1 PERSON WITH FEARED COMPLAINT IN WHOM NO DIAGNOSIS IS MADE: Primary | ICD-10-CM

## 2019-07-04 PROCEDURE — 99281 EMR DPT VST MAYX REQ PHY/QHP: CPT

## 2019-07-04 NOTE — TELEPHONE ENCOUNTER
LM: Will discuss the following...  Herpes zoster is much less contagious and usually requires close contact or exposure to open cutaneous lesions for transmission to occur, although rare cases of airborne transmission have been reported in nosocomial settings. Nonimmune women who have had such exposures to zoster infection should also be considered for postexposure prophylaxis since they are at risk for primary varicella infection.  Will Offer Varizig to reduce risk of Varicella as needed.

## 2019-07-04 NOTE — TELEPHONE ENCOUNTER
Reason for Disposition   [1] Pregnant AND [2] exposed to chickenpox within last 5 days AND [3] never received chickenpox vaccine or had chickenpox    Protocols used: CHICKENPOX EXPOSURE-A-    Patient sates she was exposed to her aunt who was diagnosed with the shingles. She lives with her as well.  Advised as per protocol

## 2019-07-05 NOTE — ED TRIAGE NOTES
Pt reports she is 29w5d pregnant and exposed to aunt who has shingles and was told to come to ER to check to see if she has shingles.  Denies ever having chicken pox as child and unsure if she ever received varicella vaccine.  Pt denies rash, f/c/n/v/d.

## 2019-07-05 NOTE — DISCHARGE INSTRUCTIONS
Follow-up with OB.  Return to this ED if you begin with burning blistered type rash, if any other problems occur.

## 2019-07-05 NOTE — ED PROVIDER NOTES
Encounter Date: 7/4/2019       History     Chief Complaint   Patient presents with    Check Up     Pt reports her aunt was dx with shingles last night. She was advised by her PCP to come in to make sure that she did not have shingles herself.      28-year-old female, approximately 29 weeks 5 days gravid, chief complaint need for evaluation for possible exposure to varicella zoster virus.  She has no rash.  She has no complaints. She was she was told to come to the ED for evaluation.  She states her aunt was diagnosed with shingles last night in this ED.  No alleviating or exacerbating factors.  No radiation of symptoms.        Review of patient's allergies indicates:  No Known Allergies  Past Medical History:   Diagnosis Date    Asthma     no meds needed, never intubated    Bipolar 1 disorder     Hx of psychiatric care     Psychiatric problem     Suicide attempt     Therapy      No past surgical history on file.  Family History   Problem Relation Age of Onset    Cancer Neg Hx     Diabetes Neg Hx      Social History     Tobacco Use    Smoking status: Former Smoker     Packs/day: 0.00    Smokeless tobacco: Never Used    Tobacco comment: marijuana   Substance Use Topics    Alcohol use: No     Frequency: Never    Drug use: No     Review of Systems   Constitutional: Negative for chills and fever.   HENT: Negative for sore throat.    Eyes: Negative.    Respiratory: Negative for shortness of breath.    Cardiovascular: Negative for chest pain.   Gastrointestinal: Negative for nausea.   Endocrine: Negative.    Genitourinary: Negative for difficulty urinating, dysuria, flank pain, pelvic pain and vaginal bleeding.   Musculoskeletal: Negative for back pain, neck pain and neck stiffness.   Skin: Negative for rash.   Neurological: Negative for weakness and headaches.   Hematological: Does not bruise/bleed easily.   Psychiatric/Behavioral: Negative.    All other systems reviewed and are negative.      Physical Exam      Initial Vitals [07/04/19 1958]   BP Pulse Resp Temp SpO2   (!) 109/58 93 18 98.5 °F (36.9 °C) 98 %      MAP       --         Physical Exam    Nursing note and vitals reviewed.  Constitutional: She appears well-developed and well-nourished. She is not diaphoretic. No distress.   HENT:   Head: Normocephalic and atraumatic.   Eyes: Conjunctivae and EOM are normal. Pupils are equal, round, and reactive to light.   Neck: Normal range of motion. Neck supple. No tracheal deviation present.   Cardiovascular: Intact distal pulses.   Pulmonary/Chest: No stridor. No respiratory distress.   Abdominal:   Appropriately gravid.  No tenderness.   Musculoskeletal: Normal range of motion. She exhibits no tenderness.   Lymphadenopathy:     She has no cervical adenopathy.   Neurological: She is alert and oriented to person, place, and time. GCS score is 15. GCS eye subscore is 4. GCS verbal subscore is 5. GCS motor subscore is 6.   Skin: Skin is warm and dry. Capillary refill takes less than 2 seconds. No rash noted.   Psychiatric: She has a normal mood and affect. Her behavior is normal. Judgment and thought content normal.         ED Course   Procedures  Labs Reviewed - No data to display       Imaging Results    None          Medical Decision Making:   Differential Diagnosis:   Zoster exposure, post exposure prophylaxis, cellulitis  ED Management:  No rash. No direct contact with aunt's rash, as she doesn't have a true vesicular rash at this time. Upon review of aunt's chart, the provider was suspicious for both possible zoster versus musculoskeletal cause of patient's right-sided chest wall pain, but no vesicular rash. I did call and speak with Dr. Barakat; given presentation, some shared decision making with patient, I do not feel need for immune globulin at this time.  OB follow-up.  Instructed to avoid direct contact with aunt.  Return precautions given.                      Clinical Impression:       ICD-10-CM ICD-9-CM   1.  Person with feared complaint in whom no diagnosis is made Z71.1 V65.5         Disposition:   Disposition: Discharged  Condition: Stable                        Alejandro Acuña PA-C  07/04/19 2039

## 2019-07-14 ENCOUNTER — HOSPITAL ENCOUNTER (EMERGENCY)
Facility: HOSPITAL | Age: 29
Discharge: HOME | End: 2019-07-14
Attending: EMERGENCY MEDICINE
Payer: MEDICAID

## 2019-07-14 ENCOUNTER — HOSPITAL ENCOUNTER (OUTPATIENT)
Facility: HOSPITAL | Age: 29
Discharge: HOME | End: 2019-07-14
Attending: OBSTETRICS & GYNECOLOGY | Admitting: OBSTETRICS & GYNECOLOGY
Payer: MEDICAID

## 2019-07-14 VITALS
HEART RATE: 100 BPM | DIASTOLIC BLOOD PRESSURE: 73 MMHG | BODY MASS INDEX: 26.68 KG/M2 | OXYGEN SATURATION: 98 % | WEIGHT: 170 LBS | TEMPERATURE: 98 F | SYSTOLIC BLOOD PRESSURE: 123 MMHG | HEIGHT: 67 IN | RESPIRATION RATE: 17 BRPM

## 2019-07-14 VITALS
TEMPERATURE: 98 F | OXYGEN SATURATION: 99 % | WEIGHT: 170 LBS | HEART RATE: 83 BPM | BODY MASS INDEX: 26.68 KG/M2 | RESPIRATION RATE: 16 BRPM | HEIGHT: 67 IN | DIASTOLIC BLOOD PRESSURE: 51 MMHG | SYSTOLIC BLOOD PRESSURE: 94 MMHG

## 2019-07-14 DIAGNOSIS — Z3A.31 31 WEEKS GESTATION OF PREGNANCY: ICD-10-CM
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
## 2019-07-14 PROCEDURE — 99211 OFF/OP EST MAY X REQ PHY/QHP: CPT

## 2019-07-14 PROCEDURE — 99283 EMERGENCY DEPT VISIT LOW MDM: CPT | Mod: 27

## 2019-07-14 PROCEDURE — 25000003 PHARM REV CODE 250: Performed by: NURSE PRACTITIONER

## 2019-07-14 RX ORDER — ONDANSETRON 8 MG/1
8 TABLET, ORALLY DISINTEGRATING ORAL EVERY 8 HOURS PRN
Status: DISCONTINUED | OUTPATIENT
Start: 2019-07-14 | End: 2019-07-14 | Stop reason: HOSPADM

## 2019-07-14 RX ORDER — ACETAMINOPHEN 500 MG
1000 TABLET ORAL
Status: COMPLETED | OUTPATIENT
Start: 2019-07-14 | End: 2019-07-14

## 2019-07-14 RX ORDER — ACETAMINOPHEN 500 MG
500 TABLET ORAL EVERY 6 HOURS PRN
Status: DISCONTINUED | OUTPATIENT
Start: 2019-07-14 | End: 2019-07-14 | Stop reason: HOSPADM

## 2019-07-14 RX ADMIN — ACETAMINOPHEN 1000 MG: 500 TABLET ORAL at 04:07
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
## 2019-07-15 NOTE — DISCHARGE INSTRUCTIONS
Please follow up with you PCP regarding neck and back pain. Please call you ob or return to the nearest emergency department if you have any further questions or concerns

## 2019-07-15 NOTE — NURSING
Late Entry 1905  Received report, assumed care of patient. RN at bedside, patient denies any VB, LOF or abdominal pain. Patient reports positive FM, stating he is more active than usual. Patient complains of neck and back pain, patient will fup with PCP and ob this week.     1949 Updated Dr Sher of patient status, reactive strip. VSS, afebrile with no complaints related to pregnancy. As per Dr Sher patient to be discharge and fup with her OB located on the [facility]

## 2019-08-05 PROBLEM — D25.9 UTERINE FIBROID IN PREGNANCY: Status: ACTIVE | Noted: 2019-08-05

## 2019-08-05 PROBLEM — O34.10 UTERINE FIBROID IN PREGNANCY: Status: ACTIVE | Noted: 2019-08-05

## 2019-09-13 ENCOUNTER — HOSPITAL ENCOUNTER (OUTPATIENT)
Facility: HOSPITAL | Age: 29
Discharge: HOME | End: 2019-09-13
Attending: OBSTETRICS & GYNECOLOGY | Admitting: OBSTETRICS & GYNECOLOGY
Payer: MEDICAID

## 2019-09-13 VITALS
OXYGEN SATURATION: 97 % | HEART RATE: 80 BPM | RESPIRATION RATE: 18 BRPM | HEIGHT: 67 IN | WEIGHT: 170 LBS | SYSTOLIC BLOOD PRESSURE: 110 MMHG | BODY MASS INDEX: 26.68 KG/M2 | TEMPERATURE: 98 F | DIASTOLIC BLOOD PRESSURE: 64 MMHG

## 2019-09-13 PROCEDURE — 99211 OFF/OP EST MAY X REQ PHY/QHP: CPT

## 2019-09-13 RX ORDER — ACETAMINOPHEN 500 MG
1000 TABLET ORAL EVERY 6 HOURS PRN
COMMUNITY

## 2019-09-13 NOTE — NURSING
"Pt arrived to 223 with c/o decreased fetal movement and headache. Pt reports 8/10 constant frontal headache since this morning with no relief after taking Tylenol 1g PO at 1300.  Pt states she has felt fetal movement, but "not as much as usual". Denies contractions, leaking of fluids, or vaginal bleeding. EFM and TOCO applied to soft, gravid, nontender abdomen. Call bell within reach, reviewed plan of care. Will continue to monitor and notify MD of arrival.  "

## 2019-09-13 NOTE — DISCHARGE INSTRUCTIONS
Home Undelivered Discharge Instructions    After Discharge Orders:    Future Appointments   Date Time Provider Department Center   9/17/2019  3:00 PM [facility]   9/17/2019  3:40 PM Tahir Noble MD [facility]       Call physician with questions or concerns.    Current Discharge Medication List      CONTINUE these medications which have NOT CHANGED    Details   acetaminophen (TYLENOL) 500 MG tablet Take 1,000 mg by mouth every 6 (six) hours as needed for Pain.      ondansetron (ZOFRAN-ODT) 4 MG TbDL place 1 tablet on tongue every 6 hours as needed  Qty: 30 tablet, Refills: 0    Associated Diagnoses: Encounter for supervision of normal first pregnancy in first trimester      prenat.vits,reina,min-iron-folic (PRENATAL VITAMIN) Tab Take 1 tablet by mouth once daily. May substitute for a prenatal vitamin that her insurance will cover.  Qty: 90 each, Refills: 3                     · Diet:  normal diet as tolerated    · Rest: normal activity as tolerated    Other instructions: Do kick counts once a day on your baby. Choose the time of day your baby is most active. Get in a comfortable lying or sitting position and time how long it takes to feel 10 kicks, twists, turns, swishes, or rolls. Call your physician or midwife if there have not been 10 kicks in 1 hours    Call physician or midwife, return to Labor and Delivery, call 911, or go to the nearest Emergency Room if: increased leakage or fluid, contractions more than  5 per  30 minutes, decreased fetal movement, persistent low back pain or cramping, bleeding from vaginal area, difficulty urinating, pain with urination, difficulty breathing, new calf pain, persistent headache or vision change.

## 2019-09-13 NOTE — NURSING
Reported patient c/o headache and decreased FM, SVE, VSS, reactive tracing, no contractions per TOCO to Dr. Aviles. Phone order received to discharge patient home.

## 2019-09-13 NOTE — NURSING
Verbal and written discharge instructions given to patient. Verbalized good understanding. Denies questions/concerns at this time. Ambulated off unit in no apparent distress.

## 2019-09-17 NOTE — H&P (VIEW-ONLY)
29 y.o.  presents for pre-op H&P for IOL for Post dates pregnancy.  eDC was 19.  Has unfavorable cervix.  understands that this IOL may not be successful.  .  Patient's last menstrual period was 2018 (exact date)..       Past Medical History:   Diagnosis Date    Asthma     no meds needed, never intubated    Bipolar 1 disorder     Hx of psychiatric care     Psychiatric problem     Suicide attempt     Therapy      History reviewed. No pertinent surgical history.  Family History   Problem Relation Age of Onset    Cancer Neg Hx     Diabetes Neg Hx      Review of patient's allergies indicates:  No Known Allergies    Current Outpatient Medications:     acetaminophen (TYLENOL) 500 MG tablet, Take 1,000 mg by mouth every 6 (six) hours as needed for Pain., Disp: , Rfl:     ondansetron (ZOFRAN-ODT) 4 MG TbDL, place 1 tablet on tongue every 6 hours as needed, Disp: 30 tablet, Rfl: 0    prenat.vits,reina,min-iron-folic (PRENATAL VITAMIN) Tab, Take 1 tablet by mouth once daily. 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   Social Needs    Financial resource strain: Not on file    Food insecurity:     Worry: Not on file     Inability: Not on file    Transportation needs:     Medical: Not on file     Non-medical: Not on file   Tobacco Use    Smoking status: Former Smoker     Packs/day: 0.00    Smokeless tobacco: Never Used    Tobacco comment: marijuana   Substance and Sexual Activity    Alcohol use: No     Frequency: Never    Drug use: No    Sexual activity: Yes     Partners: Male     Birth control/protection: None   Lifestyle    Physical activity:     Days per week: Not on file      Minutes per session: Not on file    Stress: Not on file   Relationships    Social connections:     Talks on phone: Not on file     Gets together: Not on file     Attends Yarsanism service: Not on file     Active member of club or organization: Not on file     Attends meetings of clubs or organizations: Not on file     Relationship status: Not on file   Other Topics Concern    Patient feels they ought to cut down on drinking/drug use Yes    Patient annoyed by others criticizing their drinking/drug use No    Patient has felt bad or guilty about drinking/drug use Yes    Patient has had a drink/used drugs as an eye opener in the AM No   Social History Narrative    Not on file         Vitals:    09/17/19 1533   BP: 120/77     General Appearance: Alert, appropriate appearance for age. No acute distress, Chest/Respiratory Exam: normal, CTA  Cardiovascular Exam: RRR  Gastrointestinal Exam: soft, NT/ND  Pelvic Exam Female: cervix is closed   Psychiatric Exam: Alert and oriented, appropriate affect.    Assessment: IUP at 40 6/7 / IOL for post dates  Plan: IOL  I have discussed the risks, benefits, indications, and alternatives of the procedure in detail.  The patient verbalizes her understanding.  All questions answered.  Consents signed.  The patient agrees to proceed to proceed as planned.    Tahir Noble MD

## 2019-09-18 ENCOUNTER — HOSPITAL ENCOUNTER (INPATIENT)
Facility: HOSPITAL | Age: 29
LOS: 3 days | Discharge: HOME | DRG: 819 | End: 2019-09-21
Attending: OBSTETRICS & GYNECOLOGY | Admitting: OBSTETRICS & GYNECOLOGY
Payer: MEDICAID

## 2019-09-18 DIAGNOSIS — Z34.83 PRENATAL CARE, SUBSEQUENT PREGNANCY, THIRD TRIMESTER: ICD-10-CM

## 2019-09-18 LAB
ABO + RH BLD: NORMAL
BASOPHILS # BLD AUTO: 0.01 K/UL (ref 0–0.2)
BASOPHILS NFR BLD: 0.1 % (ref 0–1.9)
BLD GP AB SCN CELLS X3 SERPL QL: NORMAL
DIFFERENTIAL METHOD: ABNORMAL
EOSINOPHIL # BLD AUTO: 0.2 K/UL (ref 0–0.5)
EOSINOPHIL NFR BLD: 1.8 % (ref 0–8)
ERYTHROCYTE [DISTWIDTH] IN BLOOD BY AUTOMATED COUNT: 13.2 % (ref 11.5–14.5)
HCT VFR BLD AUTO: 32.7 % (ref 37–48.5)
HGB BLD-MCNC: 10.7 G/DL (ref 12–16)
LYMPHOCYTES # BLD AUTO: 1.4 K/UL (ref 1–4.8)
LYMPHOCYTES NFR BLD: 16.6 % (ref 18–48)
MCH RBC QN AUTO: 28.5 PG (ref 27–31)
MCHC RBC AUTO-ENTMCNC: 32.7 G/DL (ref 32–36)
MCV RBC AUTO: 87 FL (ref 82–98)
MONOCYTES # BLD AUTO: 0.6 K/UL (ref 0.3–1)
MONOCYTES NFR BLD: 6.6 % (ref 4–15)
NEUTROPHILS # BLD AUTO: 6.2 K/UL (ref 1.8–7.7)
NEUTROPHILS NFR BLD: 75.1 % (ref 38–73)
PLATELET # BLD AUTO: 317 K/UL (ref 150–350)
PMV BLD AUTO: 10.2 FL (ref 9.2–12.9)
RBC # BLD AUTO: 3.76 M/UL (ref 4–5.4)
WBC # BLD AUTO: 8.31 K/UL (ref 3.9–12.7)

## 2019-09-18 PROCEDURE — 85025 COMPLETE CBC W/AUTO DIFF WBC: CPT

## 2019-09-18 PROCEDURE — 72100002 HC LABOR CARE, 1ST 8 HOURS

## 2019-09-18 PROCEDURE — 25000003 PHARM REV CODE 250: Performed by: OBSTETRICS & GYNECOLOGY

## 2019-09-18 PROCEDURE — 11000001 HC ACUTE MED/SURG PRIVATE ROOM

## 2019-09-18 PROCEDURE — 86850 RBC ANTIBODY SCREEN: CPT

## 2019-09-18 RX ORDER — SODIUM CHLORIDE, SODIUM LACTATE, POTASSIUM CHLORIDE, CALCIUM CHLORIDE 600; 310; 30; 20 MG/100ML; MG/100ML; MG/100ML; MG/100ML
INJECTION, SOLUTION INTRAVENOUS CONTINUOUS
Status: DISCONTINUED | OUTPATIENT
Start: 2019-09-18 | End: 2019-09-21 | Stop reason: HOSPADM

## 2019-09-18 RX ORDER — SODIUM CHLORIDE 9 MG/ML
INJECTION, SOLUTION INTRAVENOUS
Status: DISCONTINUED | OUTPATIENT
Start: 2019-09-18 | End: 2019-09-21 | Stop reason: HOSPADM

## 2019-09-18 RX ORDER — ONDANSETRON 8 MG/1
8 TABLET, ORALLY DISINTEGRATING ORAL EVERY 8 HOURS PRN
Status: DISCONTINUED | OUTPATIENT
Start: 2019-09-18 | End: 2019-09-21 | Stop reason: HOSPADM

## 2019-09-18 RX ORDER — OXYTOCIN/RINGER'S LACTATE 30/500 ML
334 PLASTIC BAG, INJECTION (ML) INTRAVENOUS ONCE
Status: COMPLETED | OUTPATIENT
Start: 2019-09-18 | End: 2019-09-19

## 2019-09-18 RX ORDER — OXYTOCIN/RINGER'S LACTATE 30/500 ML
95 PLASTIC BAG, INJECTION (ML) INTRAVENOUS ONCE
Status: DISCONTINUED | OUTPATIENT
Start: 2019-09-18 | End: 2019-09-21 | Stop reason: HOSPADM

## 2019-09-18 RX ORDER — BUTORPHANOL TARTRATE 2 MG/ML
1 INJECTION INTRAMUSCULAR; INTRAVENOUS
Status: DISCONTINUED | OUTPATIENT
Start: 2019-09-18 | End: 2019-09-21 | Stop reason: HOSPADM

## 2019-09-18 RX ORDER — CALCIUM CARBONATE 200(500)MG
500 TABLET,CHEWABLE ORAL 3 TIMES DAILY PRN
Status: DISCONTINUED | OUTPATIENT
Start: 2019-09-18 | End: 2019-09-21 | Stop reason: HOSPADM

## 2019-09-18 RX ORDER — OXYTOCIN/RINGER'S LACTATE 30/500 ML
2 PLASTIC BAG, INJECTION (ML) INTRAVENOUS CONTINUOUS
Status: DISCONTINUED | OUTPATIENT
Start: 2019-09-18 | End: 2019-09-21 | Stop reason: HOSPADM

## 2019-09-18 RX ORDER — SIMETHICONE 80 MG
1 TABLET,CHEWABLE ORAL 4 TIMES DAILY PRN
Status: DISCONTINUED | OUTPATIENT
Start: 2019-09-18 | End: 2019-09-21 | Stop reason: HOSPADM

## 2019-09-18 RX ADMIN — DINOPROSTONE 10 MG: 10 INSERT VAGINAL at 09:09

## 2019-09-19 ENCOUNTER — ANESTHESIA (OUTPATIENT)
Dept: OBSTETRICS AND GYNECOLOGY | Facility: HOSPITAL | Age: 29
DRG: 819 | End: 2019-09-19
Payer: MEDICAID

## 2019-09-19 PROCEDURE — C1751 CATH, INF, PER/CENT/MIDLINE: HCPCS | Performed by: ANESTHESIOLOGY

## 2019-09-19 PROCEDURE — S0020 INJECTION, BUPIVICAINE HYDRO: HCPCS | Performed by: ANESTHESIOLOGY

## 2019-09-19 PROCEDURE — 25000003 PHARM REV CODE 250: Performed by: OBSTETRICS & GYNECOLOGY

## 2019-09-19 PROCEDURE — 72100003 HC LABOR CARE, EA. ADDL. 8 HRS

## 2019-09-19 PROCEDURE — 59409 OBSTETRICAL CARE: CPT | Mod: AA,,, | Performed by: ANESTHESIOLOGY

## 2019-09-19 PROCEDURE — 63600175 PHARM REV CODE 636 W HCPCS: Performed by: OBSTETRICS & GYNECOLOGY

## 2019-09-19 PROCEDURE — 25000003 PHARM REV CODE 250: Performed by: ANESTHESIOLOGY

## 2019-09-19 PROCEDURE — 59409 PRA ETRICAL CARE,VAG DELIV ONLY: ICD-10-PCS | Mod: AA,,, | Performed by: ANESTHESIOLOGY

## 2019-09-19 PROCEDURE — 62326 NJX INTERLAMINAR LMBR/SAC: CPT | Performed by: ANESTHESIOLOGY

## 2019-09-19 PROCEDURE — 72200004 HC VAGINAL DELIVERY LEVEL I

## 2019-09-19 PROCEDURE — 11000001 HC ACUTE MED/SURG PRIVATE ROOM

## 2019-09-19 PROCEDURE — 51702 INSERT TEMP BLADDER CATH: CPT

## 2019-09-19 PROCEDURE — 27200710 HC EPIDURAL INFUSION PUMP SET: Performed by: ANESTHESIOLOGY

## 2019-09-19 RX ORDER — ACETAMINOPHEN 325 MG/1
650 TABLET ORAL EVERY 6 HOURS PRN
Status: DISCONTINUED | OUTPATIENT
Start: 2019-09-19 | End: 2019-09-21 | Stop reason: HOSPADM

## 2019-09-19 RX ORDER — DOCUSATE SODIUM 100 MG/1
200 CAPSULE, LIQUID FILLED ORAL 2 TIMES DAILY PRN
Status: DISCONTINUED | OUTPATIENT
Start: 2019-09-19 | End: 2019-09-21 | Stop reason: HOSPADM

## 2019-09-19 RX ORDER — DIPHENHYDRAMINE HYDROCHLORIDE 50 MG/ML
25 INJECTION INTRAMUSCULAR; INTRAVENOUS EVERY 4 HOURS PRN
Status: DISCONTINUED | OUTPATIENT
Start: 2019-09-19 | End: 2019-09-21 | Stop reason: HOSPADM

## 2019-09-19 RX ORDER — OXYTOCIN/RINGER'S LACTATE 30/500 ML
PLASTIC BAG, INJECTION (ML) INTRAVENOUS
Status: DISPENSED
Start: 2019-09-19 | End: 2019-09-19

## 2019-09-19 RX ORDER — FENTANYL/BUPIVACAINE/NS/PF 2MCG/ML-.1
PLASTIC BAG, INJECTION (ML) INJECTION CONTINUOUS PRN
Status: DISCONTINUED | OUTPATIENT
Start: 2019-09-19 | End: 2019-09-19

## 2019-09-19 RX ORDER — OXYCODONE AND ACETAMINOPHEN 5; 325 MG/1; MG/1
1 TABLET ORAL EVERY 4 HOURS PRN
Status: DISCONTINUED | OUTPATIENT
Start: 2019-09-19 | End: 2019-09-21 | Stop reason: HOSPADM

## 2019-09-19 RX ORDER — IBUPROFEN 600 MG/1
600 TABLET ORAL EVERY 6 HOURS PRN
Status: DISCONTINUED | OUTPATIENT
Start: 2019-09-19 | End: 2019-09-21 | Stop reason: HOSPADM

## 2019-09-19 RX ORDER — ONDANSETRON 8 MG/1
8 TABLET, ORALLY DISINTEGRATING ORAL EVERY 8 HOURS PRN
Status: DISCONTINUED | OUTPATIENT
Start: 2019-09-19 | End: 2019-09-21 | Stop reason: HOSPADM

## 2019-09-19 RX ORDER — OXYTOCIN/RINGER'S LACTATE 30/500 ML
95 PLASTIC BAG, INJECTION (ML) INTRAVENOUS ONCE
Status: DISCONTINUED | OUTPATIENT
Start: 2019-09-19 | End: 2019-09-21 | Stop reason: HOSPADM

## 2019-09-19 RX ORDER — LIDOCAINE HYDROCHLORIDE AND EPINEPHRINE 15; 5 MG/ML; UG/ML
INJECTION, SOLUTION EPIDURAL
Status: DISCONTINUED | OUTPATIENT
Start: 2019-09-19 | End: 2019-09-19

## 2019-09-19 RX ORDER — BUPIVACAINE HYDROCHLORIDE 2.5 MG/ML
INJECTION, SOLUTION INFILTRATION; PERINEURAL CONTINUOUS PRN
Status: DISCONTINUED | OUTPATIENT
Start: 2019-09-19 | End: 2019-09-19

## 2019-09-19 RX ORDER — DIPHENHYDRAMINE HCL 25 MG
25 CAPSULE ORAL EVERY 4 HOURS PRN
Status: DISCONTINUED | OUTPATIENT
Start: 2019-09-19 | End: 2019-09-21 | Stop reason: HOSPADM

## 2019-09-19 RX ADMIN — PROMETHAZINE HYDROCHLORIDE 12.5 MG: 25 INJECTION INTRAMUSCULAR; INTRAVENOUS at 12:09

## 2019-09-19 RX ADMIN — IBUPROFEN 600 MG: 600 TABLET, FILM COATED ORAL at 07:09

## 2019-09-19 RX ADMIN — SODIUM CHLORIDE, SODIUM LACTATE, POTASSIUM CHLORIDE, AND CALCIUM CHLORIDE 1000 ML: .6; .31; .03; .02 INJECTION, SOLUTION INTRAVENOUS at 12:09

## 2019-09-19 RX ADMIN — SIMETHICONE CHEW TAB 80 MG 80 MG: 80 TABLET ORAL at 07:09

## 2019-09-19 RX ADMIN — BUPIVACAINE HYDROCHLORIDE 4 ML/HR: 2.5 INJECTION, SOLUTION INFILTRATION; PERINEURAL at 03:09

## 2019-09-19 RX ADMIN — SODIUM CHLORIDE, SODIUM LACTATE, POTASSIUM CHLORIDE, AND CALCIUM CHLORIDE 250 ML/HR: .6; .31; .03; .02 INJECTION, SOLUTION INTRAVENOUS at 05:09

## 2019-09-19 RX ADMIN — OXYCODONE HYDROCHLORIDE AND ACETAMINOPHEN 1 TABLET: 5; 325 TABLET ORAL at 11:09

## 2019-09-19 RX ADMIN — LIDOCAINE HYDROCHLORIDE,EPINEPHRINE BITARTRATE 3 ML: 15; .005 INJECTION, SOLUTION EPIDURAL; INFILTRATION; INTRACAUDAL; PERINEURAL at 02:09

## 2019-09-19 RX ADMIN — OXYCODONE HYDROCHLORIDE AND ACETAMINOPHEN 1 TABLET: 5; 325 TABLET ORAL at 04:09

## 2019-09-19 RX ADMIN — IBUPROFEN 600 MG: 600 TABLET, FILM COATED ORAL at 11:09

## 2019-09-19 RX ADMIN — Medication 10 ML/HR: at 03:09

## 2019-09-19 RX ADMIN — SODIUM CHLORIDE, SODIUM LACTATE, POTASSIUM CHLORIDE, AND CALCIUM CHLORIDE 125 ML/HR: .6; .31; .03; .02 INJECTION, SOLUTION INTRAVENOUS at 03:09

## 2019-09-19 RX ADMIN — Medication 334 MILLI-UNITS/MIN: at 08:09

## 2019-09-19 RX ADMIN — BUTORPHANOL TARTRATE 1 MG: 2 INJECTION, SOLUTION INTRAMUSCULAR; INTRAVENOUS at 12:09

## 2019-09-19 NOTE — ANESTHESIA PREPROCEDURE EVALUATION
09/19/2019  Almaz Mora is a 29 y.o., female.    Anesthesia Evaluation     I have reviewed the Nursing Notes.      Review of Systems  Anesthesia Hx:  No previous Anesthesia   Social:  Non-Smoker, Former Smoker    Cardiovascular:  Cardiovascular Normal Exercise tolerance: good     Pulmonary:   Denies Pneumonia Denies COPD. Asthma asymptomatic  Denies Shortness of breath.  Denies Recent URI.  Denies Sleep Apnea.    Renal/:  Renal/ Normal     Hepatic/GI:   No Bowel Prep. Denies PUD. Denies Liver Disease. Denies Hepatitis.    Neurological:  Neurology Normal    Endocrine:  Endocrine Normal    Psych:   Psychiatric History          Physical Exam  General:  Well nourished    Airway/Jaw/Neck:  AIRWAY FINDINGS: Normal      Chest/Lungs:  Chest/Lungs Clear    Heart/Vascular:  Heart Findings: Normal       Mental Status:  Mental Status Findings: Normal        Anesthesia Plan  Type of Anesthesia, risks & benefits discussed:  Anesthesia Type:  epidural  Patient's Preference:   Intra-op Monitoring Plan: standard ASA monitors  Intra-op Monitoring Plan Comments:   Post Op Pain Control Plan:   Post Op Pain Control Plan Comments:   Induction:    Beta Blocker:  Patient is not currently on a Beta-Blocker (No further documentation required).       Informed Consent: Patient understands risks and agrees with Anesthesia plan.  Questions answered. Anesthesia consent signed with patient.  ASA Score: 2     Day of Surgery Review of History & Physical:  There are no significant changes.  H&P update referred to the provider.         Ready For Surgery From Anesthesia Perspective.

## 2019-09-19 NOTE — ANESTHESIA PROCEDURE NOTES
Epidural    Patient location during procedure: OB   Reason for block: primary anesthetic   Diagnosis: IUP   Start time: 9/19/2019 2:50 AM  Timeout: 9/19/2019 2:50 AM  End time: 9/19/2019 3:10 AM    Staffing  Performing Provider: Yefri Jacobson MD  Authorizing Provider: Yefri Jacobson MD        Preanesthetic Checklist  Completed: patient identified, pre-op evaluation, timeout performed, IV checked, risks and benefits discussed, monitors and equipment checked, anesthesia consent given, hand hygiene performed and patient being monitored  Preparation  Patient position: sitting  Prep: ChloraPrep  Patient monitoring: ECG, Pulse Ox and Blood Pressure  Epidural  Skin Anesthetic: lidocaine 1%  Skin Wheal: 3 mL  Administration type: continuous  Approach: midline  Interspace: L4-5    Injection technique: JOYCE saline  Needle and Epidural Catheter  Needle type: Tuohy   Needle gauge: 17  Needle length: 3.5 inches  Needle insertion depth: 7 cm  Catheter type: springwound  Catheter size: 19 G  Catheter at skin depth: 13 cm  Test dose: 3 mL of lidocaine 1.5% with Epi 1-to-200,000  Additional Documentation: negative aspiration for heme and CSF, no signs/symptoms of IV or SA injection, no paresthesia on injection, no significant pain on injection and no significant complaints from patient  Needle localization: anatomical landmarks  Medications:  Volume per aspiration: 4 mL  Time between aspirations: 3 minutes  Assessment  Ease of block: easy  Patient's tolerance of the procedure: comfortable throughout block and no complaintsNo inadvertent dural puncture with Tuohy.  Dural puncture not performed with spinal needle

## 2019-09-19 NOTE — INTERVAL H&P NOTE
The patient has been seen and examined and agree with H&P performed in clinic.    She is a 30 yo  @ 41w1d for IOL for postdates.  Now s/p cervidil and completely dilated 2+ station.  Vitals: wnl  FHTs: category 1  TOCO: CTXs q 3 mins  SCE: C/C/2+    Labs reviewed    A/P:   anticipated  Bipolar - stable no meds  Asthma - stable no meds

## 2019-09-19 NOTE — L&D DELIVERY NOTE
Vaginal Delivery Note    Physician: Jennifer Dunn MD    Pre-procedure diagnosis:   Induction of labor  GBS negative    Post-procedure diagnosis: Same    Anesthesia: Epidural    Estimated blood loss: 200 cc    Lacerations: 2nd degree, repaired with 2-0 Vicryl Rapide in the usual fashion    Findings: Viable male infant in the vertex position with Apgars 9/9, and weight not yet recorded    Delivery Note: Patient called out that she felt vaginal pressure.  She was checked and found to be completely dilated at 2+ station.  With instruction, she pushed and the infant's head delivered atraumatically followed by anterior shoulder and rest of the infant's body without any difficulty.  The infant was placed on the mother's abdomen and its cord was clamped x2 and cut.  The infant's mouth and nose were bulb suctioned and it was dried and stimulated with warm towels.  Cord blood was sampled.  The placenta was then delivered spontaneously, inspected and found to be intact.    The patient tolerated the procedure well.      Complications: None    Additional medications: Pitocin IV

## 2019-09-19 NOTE — LACTATION NOTE
09/19/19 0915   Maternal Assessment   Breast Shape Bilateral:;round   Breast Density Bilateral:;soft   Areola Bilateral:;elastic   Nipples Left:;inverted;Right:;flat   Maternal Infant Feeding   Maternal Emotional State relaxed;assist needed   Infant Positioning clutch/football   Signs of Milk Transfer audible swallow;infant jaw motion present   Pain with Feeding no   Comfort Measures Before/During Feeding infant position adjusted;latch adjusted;maternal position adjusted   Latch Assistance yes     Baby skin to skin with mother after delivery. Reviewed basic breastfeeding instructions with mother and answered all questions. Demonstrated hand expression. Baby rooting. Attempted to latch baby to right breast in cradle position. Mother has flat nipple and baby not opening mouth widely enough to grasp nipple. Moved baby into football position. Baby still not opening mouth widely enough. Decision made to use nipple shield. Baby able to latch effectively with shield in place. Baby nursing well with several audible swallows. Will follow up with mother after transfer to postpartum room.

## 2019-09-20 LAB
BASOPHILS # BLD AUTO: 0.01 K/UL (ref 0–0.2)
BASOPHILS NFR BLD: 0.1 % (ref 0–1.9)
DIFFERENTIAL METHOD: ABNORMAL
EOSINOPHIL # BLD AUTO: 0.3 K/UL (ref 0–0.5)
EOSINOPHIL NFR BLD: 2.2 % (ref 0–8)
ERYTHROCYTE [DISTWIDTH] IN BLOOD BY AUTOMATED COUNT: 13.5 % (ref 11.5–14.5)
HCT VFR BLD AUTO: 29.2 % (ref 37–48.5)
HGB BLD-MCNC: 9.2 G/DL (ref 12–16)
LYMPHOCYTES # BLD AUTO: 2.3 K/UL (ref 1–4.8)
LYMPHOCYTES NFR BLD: 20.4 % (ref 18–48)
MCH RBC QN AUTO: 27.8 PG (ref 27–31)
MCHC RBC AUTO-ENTMCNC: 31.5 G/DL (ref 32–36)
MCV RBC AUTO: 88 FL (ref 82–98)
MONOCYTES # BLD AUTO: 0.8 K/UL (ref 0.3–1)
MONOCYTES NFR BLD: 7 % (ref 4–15)
NEUTROPHILS # BLD AUTO: 7.9 K/UL (ref 1.8–7.7)
NEUTROPHILS NFR BLD: 70.6 % (ref 38–73)
PLATELET # BLD AUTO: 253 K/UL (ref 150–350)
PMV BLD AUTO: 9.7 FL (ref 9.2–12.9)
RBC # BLD AUTO: 3.31 M/UL (ref 4–5.4)
WBC # BLD AUTO: 11.17 K/UL (ref 3.9–12.7)

## 2019-09-20 PROCEDURE — 25000003 PHARM REV CODE 250: Performed by: OBSTETRICS & GYNECOLOGY

## 2019-09-20 PROCEDURE — 36415 COLL VENOUS BLD VENIPUNCTURE: CPT

## 2019-09-20 PROCEDURE — 11000001 HC ACUTE MED/SURG PRIVATE ROOM

## 2019-09-20 PROCEDURE — 85025 COMPLETE CBC W/AUTO DIFF WBC: CPT

## 2019-09-20 RX ADMIN — IBUPROFEN 600 MG: 600 TABLET, FILM COATED ORAL at 05:09

## 2019-09-20 RX ADMIN — IBUPROFEN 600 MG: 600 TABLET, FILM COATED ORAL at 11:09

## 2019-09-20 RX ADMIN — OXYCODONE HYDROCHLORIDE AND ACETAMINOPHEN 1 TABLET: 5; 325 TABLET ORAL at 05:09

## 2019-09-20 RX ADMIN — OXYCODONE HYDROCHLORIDE AND ACETAMINOPHEN 1 TABLET: 5; 325 TABLET ORAL at 09:09

## 2019-09-20 RX ADMIN — OXYCODONE HYDROCHLORIDE AND ACETAMINOPHEN 1 TABLET: 5; 325 TABLET ORAL at 04:09

## 2019-09-20 NOTE — PROGRESS NOTES
Delivery Progress Note  Obstetrics        SUBJECTIVE:     Postpartum Day 1    Ms. Mora states she feels well. She denies emotional concerns. Her pain is well controlled with current medications. The baby is well. The patient is ambulating well. Ms. Mora is tolerating a normal diet. Flatus has been passed. Urinary output is adequate.    OBJECTIVE:     Vital Signs (Most Recent):  Temp: 97 °F (36.1 °C) (19 1158)  Pulse: 82 (19 1158)  Resp: 18 (19 1158)  BP: (!) 106/59 (19 1158)  SpO2: 97 % (19)    Vital Signs Range (Last 24H):  Temp:  [97 °F (36.1 °C)-98.5 °F (36.9 °C)]   Pulse:  [77-90]   Resp:  [18-20]   BP: (102-123)/(50-78)   SpO2:  [97 %]     I & O (Last 24H):    Intake/Output Summary (Last 24 hours) at 2019 1427  Last data filed at 2019 0600  Gross per 24 hour   Intake 1800 ml   Output 400 ml   Net 1400 ml     Physical Exam:  General:    no distress   Lungs:  clear to auscultation bilaterally   Heart:  regular rate and rhythm, S1, S2 normal, no murmur, click, rub or gallop   Breasts:  no discharge, erythema, or tenderness   Abdomen:  soft, non-tender; bowel sounds normal   Fundus:  firm       Lochia:   scant rubra   DVT Evaluation:  No evidence of DVT on either side seen on physical exam.     Hemoglobin/Hematocrit  Recent Labs   Lab 19  0610   HGB 9.2*   HCT 29.2*     ABO/Rh  Lab Results   Component Value Date    GROUPTRH B POS 2019     Rubella  No results for input(s): RUBELLAIGGSC in the last 168 hours.    ASSESSMENT/PLAN:     Status post . Doing well.     Continue current care.

## 2019-09-20 NOTE — ANESTHESIA POSTPROCEDURE EVALUATION
Anesthesia Post Evaluation    Patient: Almaz Mora    Procedure(s) Performed: * No procedures listed *    Final Anesthesia Type: epidural  Patient location during evaluation: labor & delivery  Patient participation: Yes- Able to Participate  Level of consciousness: awake and alert  Post-procedure vital signs: reviewed and stable  Pain management: adequate  Airway patency: patent  PONV status at discharge: No PONV  Anesthetic complications: no      Cardiovascular status: blood pressure returned to baseline and hemodynamically stable  Respiratory status: unassisted and spontaneous ventilation  Hydration status: euvolemic  Follow-up not needed.          Vitals Value Taken Time   /59 9/20/2019 11:58 AM   Temp 36.1 °C (97 °F) 9/20/2019 11:58 AM   Pulse 82 9/20/2019 11:58 AM   Resp 18 9/20/2019 11:58 AM   SpO2 97 % 9/19/2019  8:27 PM         No case tracking events are documented in the log.      Pain/Estrella Score: Pain Rating Prior to Med Admin: 4 (9/20/2019 11:42 AM)  Pain Rating Post Med Admin: 2 (9/20/2019 10:37 AM)

## 2019-09-20 NOTE — LACTATION NOTE
This note was copied from a baby's chart.    Reviewed the risks of supplementation.  Discussed the adequacy of colostrum.  Instructed on normal  feeding and sleeping patterns.  Encouraged mother to feed the infant on cue, a minimum of 8 times in 24 hours prior to supplementation to promote appropriate breast stimulation for adequate milk supply.  Discussed preferred alternative feeding methods, such as supplementing the infant via breast with SNS, syringe feeding, cup feeding, spoon feeding and finger feeding.  Discussed risks and encouraged to avoid artificial bottles and nipples.  Chooses to supplement via artificial nipple.  Safely taught how to feed infant via chosen method.  Demonstrated by nurse and pt return demonstrates proper and safe usage.  States understand and provided appropriate recall of all information.    Discussed the desired feeding choice with the patient.  Reviewed the benefits of breastfeeding and the risks of formula feeding. States understanding of all information and verbalized appropriate recall.    Formula Feeding Discharge Instructions    Baby is to be fed by the Baby Led bottle feeding method:   Feed on Cue:  o Hunger cues - hands to mouth, bending arms and legs toward the body, sucking noises, puckered lips and rooting/searching for the nipple   Method of feeding the baby:  o always hold the baby upright, never prop a bottle  o brush the nipple across babys upper lip and wait to open  o hold bottle in a flat position, only partly full  o allow baby to pause and take breaks; burp as needed  o feeding lasts about 15 - 20 minutes  o Stop feeding with signs of fullness  o Fullness cues - sucking slows or stops, relaxed hands and arms, pushes away, falls asleep  Preparing Powdered Formula:   Remove plastic lid and wash lid with soap and water, dry and label with date   Clean top of can & open.  Remove scoop.   Follow s instructions on quantity of water and  powder   Follow pediatricians recommendation on the type of water to use   Shake well prior to feeding   For pre-mixed formula - Refrigerate and use within 24 hours.  Re-warm individual bottles immediately prior to use.   Formula expires 1 hour after in initiation of the feeding  Preparing Liquid Concentrate Formula:   Follow pediatricians recommendation on the type of water to use   Add equal amounts of liquid concentrate and formula to the bottle   Shake well prior to feeding   For pre-mixed formula - Refrigerate and use within 24 hours.  Re-warm individual bottles immediately prior to use   For formula remaining in the can, cover and refrigerate until needed.  Use within 48 hours   Formula expires 1 hour after in initiation of the feeding    Preparing Ready to Feed Formula:   Shake container well prior to opening   Pour enough formula for 1 feeding into a clean bottle   Do not add water or any other liquid   Attach nipple and cap   Shake well prior to feeding   Feed immediately   For pre-mixed formula - Refrigerate and use within 24 hours.  Re-warm individual bottles immediately prior to use   For formula remaining in the can, cover and refrigerate until needed.  Use within 48 hours   Formula expires 1 hour after in initiation of the feeding  Cleaning and sterilization of equipment for formula preparation:   Clean and disinfect working surface   Wash hands, arms and under fingernails with soap and water; dry using a clean cloth   Use bottle/nipple brush to wash all bottles, nipples, rings, caps and preparation utensils in hot soapy water before initial use and rinse   Sterilize all parts/utensils in boiling water or with a sterilization device prior to use   Continue to wash all parts with warm soapy water and rinse after each use and sterilize daily  Appropriate storage of formula if more than 1 bottle is prepared:   Put a clean nipple right side up on the bottle and cover with a  nipple cap   Label each bottle with the date and time prepared   Refrigerate until feeding time   Warm immediately prior to use by a bottle warmer or by running under warm water   Do NOT microwave bottles   For formula remaining in the can, cover and refrigerate until needed.  Use within 48 hours   Formula expires 1 hour after in initiation of the feeding  Safe formula feeding, preparation and transporting of pre-mixed feedings:   Always use thoroughly cleaned and sterilized BPA free bottles   Formula & water preference to be determined by the advice of the pediatrician   Use proper hand washing   Follow all s guidelines for preparing formula   Check all expiration dates   Clean all can tops with soap and water prior to opening; also use a clean can opener   All mixed formula should be refrigerated until immediately prior to transport   Transport in a cool insulated bag with ice packs and use within 2 hours or re-refrigerate at arrival destination   Re-warm feeding at the destination for no longer than 15 minutes      Community Resources     Women, Infants, and Children Nutrition Program   Provides free breastfeeding education, counseling, food coupons, and breast pumps for eligible women. Breastfeeding counseling is provided by peer counselors and mother-to-mother support.      [phone]   [email or web address]    [facility] Healthy Babies Connects moms, babies, and families in [place] to free help, pregnancy resources, and information about healthy behaviors pre- and . Available .  6-993-188-BABY   www.5098630diko.org   info@7845294jbrc.org    TBEARS ([facility] Early Relationships Support & Services)   This program is for parents who have concerns about their baby's fussiness during the first year of life. Infant specialists work with you to find more ways to soothe, care for, and enjoy your baby.  [phone]   www.[email or web address]   [email or web address]    [facility]  [facility] Provides preconception, pregnancy, and post discharge support through nutrition services, primary medical care for children, and many other services. Available on the phone and one-to-one.  [phone]   www.dcsno.org    AAPCC (Poison Control)   The American Association of Poison Control Centers supports the [place] [address] poison centers in their efforts to prevent and treat poison exposures. Poison centers offer free, confidential, expert medical advice 24 hours a day, seven days a week.  1-898.106.1544   www.aapcc.org/

## 2019-09-20 NOTE — PLAN OF CARE
Problem: Adult Inpatient Plan of Care  Goal: Plan of Care Review  Pt voiding urine and passing flatus. VSS. NAD. Vaginal bleed light. Fundus firm. Not breastfeeding. Encouraged ambulation and tolerating PO. Bonding with infant appropriately.     Provides tucks pads to pt.

## 2019-09-20 NOTE — PLAN OF CARE
Problem: Adult Inpatient Plan of Care  Goal: Plan of Care Review  Outcome: Ongoing (interventions implemented as appropriate)     19 08   Plan of Care Review   Plan of Care Reviewed With patient   - Plan of care reviewed with patient. Patient verbalized understanding.   - Fall prevention reviewed with patient. Patient verbalized understanding.   - Pain control reviewed with patient including medication reviewed.    Problem:  Fall Injury Risk  Goal: Absence of Fall, Infant Drop and Related Injury    Intervention: Prevent [place] Drop or Fall  - Patient encouraged to place infant back to sleep in crib. Patient verbalized understanding.        Problem: Adjustment to Role Transition (Postpartum Vaginal Delivery)  Goal: Successful Maternal Role Transition    Intervention: Support Maternal Role Transition     19   Promote Anxiety Reduction   Supportive Measures active listening utilized;goal setting facilitated;positive reinforcement provided;problem solving facilitated;self-care encouraged;self-responsibility promoted;verbalization of feelings encouraged   Promote Positive Parenting Behavior   Parent/Child Attachment Promotion caring behavior modeled;cue recognition promoted;face-to-face positioning promoted;interaction encouraged;parent/caregiver presence encouraged;participation in care promoted;positive reinforcement provided;rooming-in promoted;skin-to-skin contact encouraged;strengths emphasized   - Appropriate maternal bonding noted.       Problem: Bleeding (Postpartum Vaginal Delivery)  Goal: Hemostasis    Intervention: Monitor and Manage Postpartum Bleeding  - Pt with small amount of rubra lochia. No clots reported.   - Fundus firm without massage and noted to be @ umbilicus.  - Post delivery CBC noted.  Results for ADY MENDIOLA (MRN [id number]) as of 2019 12:59   Ref. Range 2019 06:10   WBC Latest Ref Range: 3.90 - 12.70 K/uL 11.17   RBC Latest Ref Range: 4.00 -  5.40 M/uL 3.31 (L)   Hemoglobin Latest Ref Range: 12.0 - 16.0 g/dL 9.2 (L)   Hematocrit Latest Ref Range: 37.0 - 48.5 % 29.2 (L)   MCV Latest Ref Range: 82 - 98 fL 88   MCH Latest Ref Range: 27.0 - 31.0 pg 27.8   MCHC Latest Ref Range: 32.0 - 36.0 g/dL 31.5 (L)   RDW Latest Ref Range: 11.5 - 14.5 % 13.5   Platelets Latest Ref Range: 150 - 350 K/uL 253   MPV Latest Ref Range: 9.2 - 12.9 fL 9.7   Gran% Latest Ref Range: 38.0 - 73.0 % 70.6   Gran # (ANC) Latest Ref Range: 1.8 - 7.7 K/uL 7.9 (H)   Lymph% Latest Ref Range: 18.0 - 48.0 % 20.4   Lymph # Latest Ref Range: 1.0 - 4.8 K/uL 2.3   Mono% Latest Ref Range: 4.0 - 15.0 % 7.0   Mono # Latest Ref Range: 0.3 - 1.0 K/uL 0.8   Eosinophil% Latest Ref Range: 0.0 - 8.0 % 2.2   Eos # Latest Ref Range: 0.0 - 0.5 K/uL 0.3   Basophil% Latest Ref Range: 0.0 - 1.9 % 0.1   Baso # Latest Ref Range: 0.00 - 0.20 K/uL 0.01   Differential Method Unknown Automated         Problem: Pain (Postpartum Vaginal Delivery)  Goal: Acceptable Pain Control    Intervention: Prevent or Manage Pain     09/20/19 0820   Prevent or Manage Pain   Pain Management Interventions around-the-clock dosing utilized;pain management plan reviewed with patient/caregiver;position adjusted;quiet environment facilitated   - Acceptable pain score 3. Pt medicated with PRN meds for pain of 5. Patient reported relief of pain.      Problem: Urinary Retention (Postpartum Vaginal Delivery)  Goal: Effective Urinary Elimination    Intervention: Promote Effective Urinary Elimination     09/20/19 0820   Promote Bladder Elimination   Urinary Elimination Promotion frequent voiding encouraged

## 2019-09-21 VITALS
HEART RATE: 87 BPM | RESPIRATION RATE: 18 BRPM | SYSTOLIC BLOOD PRESSURE: 127 MMHG | OXYGEN SATURATION: 97 % | TEMPERATURE: 98 F | WEIGHT: 178 LBS | HEIGHT: 67 IN | BODY MASS INDEX: 27.94 KG/M2 | DIASTOLIC BLOOD PRESSURE: 66 MMHG

## 2019-09-21 PROCEDURE — 25000003 PHARM REV CODE 250: Performed by: OBSTETRICS & GYNECOLOGY

## 2019-09-21 RX ORDER — IBUPROFEN 600 MG/1
600 TABLET ORAL EVERY 6 HOURS PRN
Qty: 40 TABLET | Refills: 1 | Status: SHIPPED | OUTPATIENT
Start: 2019-09-21

## 2019-09-21 RX ADMIN — IBUPROFEN 600 MG: 600 TABLET, FILM COATED ORAL at 12:09

## 2019-09-21 RX ADMIN — OXYCODONE HYDROCHLORIDE AND ACETAMINOPHEN 1 TABLET: 5; 325 TABLET ORAL at 08:09

## 2019-09-21 RX ADMIN — IBUPROFEN 600 MG: 600 TABLET, FILM COATED ORAL at 09:09

## 2019-09-21 NOTE — LACTATION NOTE
09/21/19 1200   Maternal Assessment   Breast Shape Bilateral:;round   Breast Density Bilateral:;soft   Lactation Referrals   Lactation Referrals support group;WIC (women, infants and children) program     Mother only formula feeding since yesterday. States that she may try to put baby back to breast, or pump , after she gets home. Advised mother to offer baby breast before formula or to start pumping ASAP in order to stimulate milk supply. Gave mother information on obtaining breast pump from insurance provider. Also reviewed engorgement precautions for breastfeeding and non nursing mothers. All questions answered and encouraged mother to call lactation department for any further lactation needs after discharge. Mother verbalizes understanding of all instructions with good recall.

## 2019-09-21 NOTE — DISCHARGE SUMMARY
Principle Dx:  Pregnancy     Conditions: bipolar on no meds    Hospital Course: Patient admitted, underwent  without complications.  Postpartum close was uneventful    Discharge Date: 2019    Disposition:  Discharge home    Follow-up 6 weeks    Regular diet, pelvic rest    Patient Instructions:   Current Discharge Medication List      START taking these medications    Details   ibuprofen (ADVIL,MOTRIN) 600 MG tablet Take 1 tablet (600 mg total) by mouth every 6 (six) hours as needed (cramping).  Qty: 40 tablet, Refills: 1         CONTINUE these medications which have NOT CHANGED    Details   acetaminophen (TYLENOL) 500 MG tablet Take 1,000 mg by mouth every 6 (six) hours as needed for Pain.      ondansetron (ZOFRAN-ODT) 4 MG TbDL place 1 tablet on tongue every 6 hours as needed  Qty: 30 tablet, Refills: 0    Associated Diagnoses: Encounter for supervision of normal first pregnancy in first trimester      prenat.vits,reina,min-iron-folic (PRENATAL VITAMIN) Tab Take 1 tablet by mouth once daily. May substitute for a prenatal vitamin that her insurance will cover.  Qty: 90 each, Refills: 3             No discharge procedures on file.

## 2019-09-21 NOTE — DISCHARGE INSTRUCTIONS
After a Vaginal Birth    General Discharge Instructions    · May follow a regular diet, unless otherwise discussed with physician.    · Take showers, not baths unless otherwise discussed with physician.    · Activity as tolerated.    · No lifting or heavy exercise for 6 weeks, no driving for 2 weeks, no sexual intercourse, douching or tampons for 6 weeks    · May return to work/school as discussed with physician  · Take medications as directed    · Discuss birth control with physician    · Breast care support bra worn at all times    · Lactation consultant referral number ( [phone] or [phone])    Call Your Healthcare Provider Right Away If You Have:  · A temperature of 100.4°F or higher.  · If your blood pressure is over 155/105.  · You have difficulty catching your breath or trouble breathing.  · Heavy vaginal bleeding, clots, or vaginal discharge with foul odor. (heavier than menses)  · Persistent nausea or vomiting.  · You gain more than 3 pounds in 3 days.  · Severe headaches not relieved by Tylenol (acetaminophen) or Motrin (ibuprofen)  · Blurry or double vision, see spots or flashing lights.  · Dizziness or fainting.  · New onset swelling or worsening of existing swelling.  · Burning or pain when you urinate.  · No bowel movement for 5 days.  · Redness, warmth, swelling, or pain in the lower leg.  · Redness, discharge, or pain worse than you had in the hospital.  · Burning, pain, red streaks, or lumpy areas in your breasts.  · Cracks, blisters, or blood on your nipples.  · Feelings of extreme sadness or anxiety, or a feeling that you dont want to be with your baby.  · If you have any new or unusual symptoms or have questions or concerns    Some of these symptoms can occur up to 4 to 6 weeks after delivery. This can be a sign of pre-eclampsia, which is a serious disease that can cause stroke, seizures, organ damage, or death. Do not wait to call your doctor or seek medical attention.            If  You Had Stitches  You may have received stitches in the skin near your vagina. The stitches might have closed an episiotomy (an incision that enlarges the opening of the vagina). Or you may have needed stitches to repair torn skin. Either way, your stitches should dissolve within weeks. Until then, you can help reduce discomfort, aid healing, and reduce your risk of infection by keeping the stitches clean. These tips can help:    · Gently wipe from front to back after you urinate or have a bowel movement.  · After wiping, spray warm water on the area. Or you can have a sitz bath. This means sitting in a tub with a few inches of water in it. Then pat the area dry or use a hairdryer on a cool setting.  · You can take a shower instead of a bath.  · Change sanitary pads at least every 2-4 hours.  · Place cold or heat packs on the area as directed by your doctors or nurses. Keep a thin towel between the pack and your skin.  · Sit on firm seats so the stitches pull less.     Follow-Up  Schedule a  follow-up exam with your healthcare provider for about 6 weeks after delivery. During this exam, your uterus and vaginal area will be checked. Contact your healthcare provider if you think you or your baby are having any problems.              Breastfeeding discharge instructions given with First Alert form and reviewed.  Also discussed:   AAP recommendation of exclusive breastfeeding for the first 6 months of life and continued breastfeeding with the introduction of supplemental foods beyond the first year of life.  Instructed on the recommendation to delay all bottle and pacifier use until after 4 weeks of age and breastfeeding is well established.  Discussed the benefits of exclusive breastfeeding for both mother and baby.  Discussed the risks of supplementation/pacifier use on the exclusivity of breastfeeding in the first 6 months. Feed the baby at the earliest sign of hunger or comfort  o Hands to mouth,  sucking motions  o Rooting or searching for something to suck on  o Dont wait for crying - it is a not a late sign of hunger; it is a sign of distress     The feedings may be 8-12 times per 24hrs and will not follow a schedule   Alternate the breast you start the feeding with, or start with the breast that feels the fullest   Switch breasts when the baby takes himself off the breast or falls asleep   Keep offering breasts until the baby looks full, no longer gives hunger signs, and stays asleep when placed on his back in the crib   If the baby is sleepy and wont wake for a feeding, put the baby skin-to-skin dressed in a diaper against the mothers bare chest   Sleep near your baby   The baby should be positioned and latched on to the breast correctly  o Chest-to-chest, chin in the breast  o Babys lips are flipped outward  o Babys mouth is stretched open wide like a shout  o Babys sucking should feel like tugging to the mother  - The baby should be drinking at the breast:  o You should hear swallowing or gulping throughout the feeding  o You should see milk on the babys lips when he comes off the breast  o Your breasts should be softer when the baby is finished feeding  o The baby should look relaxed at the end of feedings  o After the 4th day and your milk is in:  o The babys poop should turn bright yellow and be loose, watery, and seedy  o The baby should have at least 3-4 poops the size of the palm of your hand per day  o The baby should have at least 6-8 wet diapers per day  o The urine should be light yellow in color  You should drink when you are thirsty and eat a healthy diet when you are    hungry.     Take naps to get the rest you need.   Take medications and/or drink alcohol only with permission of your obstetrician    or the babys pediatrician.  You can also call the [facility],   ([phone]), Monday-Friday, 8am-5pm Central time, to get the most   up-to-date evidence-based  information on the use of medications during   pregnancy and breastfeeding.      The baby should be examined by a pediatrician at 3-5 days of age; unless ordered sooner by the pediatrician.  Once your milk comes in, the baby should be back to birth weight no later than 10-14 days of age.Instructed on primary engorgement and precautions.  Discussed:    Typical timing of the onset of engorgement  Signs and symptoms of engorgement  If the milk is flowing, use wet or dry heat applied to the breasts for approximately 10min prior to each feeding as a comfort measure to facilitate the milk ejection reflex    Follow heat treatment with breast massage to soften hard/lumpy areas of the breast    Use unrestricted, frequent, effective feedings    Wake baby to feed if necessary    Avoid pacifier and bottle feedings    Hand express or pump breasts to the point of comfort as needed    Use cold treatments in the form of ice packs/gel packs/ frozen vegetables wrapped in a soft thin cloth and applied to the breasts for approximately 20min after each feeding until engorgement is resolved    Wear comfortable, supportive bra    Take pain medicine as needed    Use anti-inflammatory medications if prescribed by physicianManagement of Engorgement in the Non-Nursing Mother    Your breast milk will usually come in within 3-5 days after delivery even if you have decided not to breastfeed.  Your breasts may become full, lumpy, hard and uncomfortable due to the glands filling with milk and swelling of the breast tissue, blood vessels, and lymph glands.  This is a temporary condition usually lasting 24-48 hrs.  If your breasts become uncomfortable during this time, you may use some or all of the comfort measures described below to obtain relief.    Measures to relieve discomfort:    1. Wear a well fitting supportive bra. It should not be too tight or it may lead to plugged ducts or a breast infection.  (We do not recommend that you bind your  breasts with any type of wrap.)    2. If the breasts begin to feel heavy, warm or uncomfortably full, begin using cold compresses on your breasts. Cold compresses can relieve the swelling and provide comfort.  Cold application can be in the form of ice packs, gel packs, frozen bags of vegetables or frozen wet towels. Cold compresses should be wrapped in a thin towel or a pillow case and applied to the breasts for 20 minutes at a time. This process can be repeated with a short break in between the applications of cold compresses until the swelling is relieved.     3.  Cold cabbage compresses can also be used to relieve pain and swelling.  Chilled cabbage leaves show similar pain reducing effects as cold compresses.  Some mothers prefer the cabbage leaves due to a stronger, more immediate effect. Cabbage leave effects are not clinically proven but many mothers find them very soothing.    How to apply chilled cabbage compresses:  rinse with cool water and refrigerate raw cabbage leaves.  Strip the large vein off the cold cabbage leaf and put the remaining part of the cabbage leaf directly on the breast. The leaves should be worn inside the bra until they wilt, usually within 2-4 hours.  When they wilt they should be replaced with fresh leaves.   If redness, rash, or itching occur stop use immediately.    4. Anti-inflammatory medications such as ibuprofen may be taken, with your physicians approval, to reduce inflammation and discomfort.     6. If fullness persists and remains painful even after all of the above measures are used, hand expressing a small amount of milk out of the breasts can provide an extra measure of comfort.  Warm showers, letting the warm water hit your back and roll over your shoulders to the breasts, while you gently express milk can make hand expressing a little easier. You should only remove enough milk to provide comfort and relief.   Repeat this process as often as needed to keep the breasts  comfortable.    7. You can pump your breasts and remove just enough milk to feel softer, but not so much that more milk production is stimulated.   Repeat this process as often as needed to keep the breasts comfortable.    8. There is no need to limit the amount of fluids that you drink.       9. Engorgement will usually get better within 24-48 hrs; however, there may be some fullness and/or leaking of milk for several days. Wear breast pads to absorb any leaking milk and change them frequently.    10. If you have any flu-like symptoms such as fever, chills, feeling tired and achy or red areas on your breast, call your physician immediately.  11.Call the [facility], ***, if the engorgement is not relieved or if you have questions.

## 2019-09-21 NOTE — PROGRESS NOTES
Discharge instructions given and reviewed with pt, including the need to schedule follow up appointment, prescriptions, and when to seek medical attention. All questions encouraged and answered, pt verb understanding. Pt will notify staff when ready to be escorted off unit, NAD noted at this time.

## 2019-09-23 ENCOUNTER — TELEPHONE (OUTPATIENT)
Dept: OBSTETRICS AND GYNECOLOGY | Facility: HOSPITAL | Age: 29
End: 2019-09-23

## 2019-09-23 NOTE — TELEPHONE ENCOUNTER
Spoke to pt -states has been formula feeding but [facility] and has appt to get a pump -states breast are full but not uncomfortable yet -reinforced may be too late to wait until Wednesday and suggested she try baby at breast or hand expressing to soften them some -states okay she will probably try that today -encouraged to call back with any concerns

## 2020-02-01 ENCOUNTER — HOSPITAL ENCOUNTER (EMERGENCY)
Facility: HOSPITAL | Age: 30
Discharge: PSYCHIATRIC OR SUBSTANCE USE FACILITY | End: 2020-02-01
Attending: EMERGENCY MEDICINE
Payer: MEDICAID

## 2020-02-01 VITALS
WEIGHT: 175 LBS | HEART RATE: 94 BPM | TEMPERATURE: 98 F | DIASTOLIC BLOOD PRESSURE: 68 MMHG | RESPIRATION RATE: 18 BRPM | OXYGEN SATURATION: 99 % | HEIGHT: 66 IN | BODY MASS INDEX: 28.12 KG/M2 | SYSTOLIC BLOOD PRESSURE: 139 MMHG

## 2020-02-01 DIAGNOSIS — T14.91XA SUICIDE ATTEMPT: Primary | ICD-10-CM

## 2020-02-01 PROBLEM — F32.9 MAJOR DEPRESSION: Status: ACTIVE | Noted: 2020-02-01

## 2020-02-01 LAB
ALBUMIN SERPL BCP-MCNC: 4.1 G/DL (ref 3.5–5.2)
ALP SERPL-CCNC: 97 U/L (ref 55–135)
ALT SERPL W/O P-5'-P-CCNC: 17 U/L (ref 10–44)
AMPHET+METHAMPHET UR QL: NEGATIVE
ANION GAP SERPL CALC-SCNC: 10 MMOL/L (ref 8–16)
APAP SERPL-MCNC: <3 UG/ML (ref 10–20)
AST SERPL-CCNC: 15 U/L (ref 10–40)
B-HCG UR QL: NEGATIVE
BARBITURATES UR QL SCN>200 NG/ML: NEGATIVE
BASOPHILS # BLD AUTO: 0.05 K/UL (ref 0–0.2)
BASOPHILS NFR BLD: 0.5 % (ref 0–1.9)
BENZODIAZ UR QL SCN>200 NG/ML: NEGATIVE
BILIRUB SERPL-MCNC: 0.2 MG/DL (ref 0.1–1)
BILIRUB UR QL STRIP: NEGATIVE
BUN SERPL-MCNC: 5 MG/DL (ref 6–20)
BZE UR QL SCN: NEGATIVE
CALCIUM SERPL-MCNC: 9 MG/DL (ref 8.7–10.5)
CANNABINOIDS UR QL SCN: NEGATIVE
CHLORIDE SERPL-SCNC: 110 MMOL/L (ref 95–110)
CLARITY UR: CLEAR
CO2 SERPL-SCNC: 23 MMOL/L (ref 23–29)
COLOR UR: COLORLESS
CREAT SERPL-MCNC: 0.7 MG/DL (ref 0.5–1.4)
CREAT UR-MCNC: 17 MG/DL (ref 15–325)
CTP QC/QA: YES
DIFFERENTIAL METHOD: ABNORMAL
EOSINOPHIL # BLD AUTO: 0 K/UL (ref 0–0.5)
EOSINOPHIL NFR BLD: 0.2 % (ref 0–8)
ERYTHROCYTE [DISTWIDTH] IN BLOOD BY AUTOMATED COUNT: 13.9 % (ref 11.5–14.5)
EST. GFR  (AFRICAN AMERICAN): >60 ML/MIN/1.73 M^2
EST. GFR  (NON AFRICAN AMERICAN): >60 ML/MIN/1.73 M^2
ETHANOL SERPL-MCNC: 176 MG/DL
ETHANOL SERPL-MCNC: 289 MG/DL
ETHANOL SERPL-MCNC: 73 MG/DL
GLUCOSE SERPL-MCNC: 116 MG/DL (ref 70–110)
GLUCOSE UR QL STRIP: NEGATIVE
HCT VFR BLD AUTO: 41.8 % (ref 37–48.5)
HGB BLD-MCNC: 13.1 G/DL (ref 12–16)
HGB UR QL STRIP: NEGATIVE
IMM GRANULOCYTES # BLD AUTO: 0.02 K/UL (ref 0–0.04)
IMM GRANULOCYTES NFR BLD AUTO: 0.2 % (ref 0–0.5)
KETONES UR QL STRIP: NEGATIVE
LEUKOCYTE ESTERASE UR QL STRIP: NEGATIVE
LYMPHOCYTES # BLD AUTO: 2.4 K/UL (ref 1–4.8)
LYMPHOCYTES NFR BLD: 25.1 % (ref 18–48)
MCH RBC QN AUTO: 28.1 PG (ref 27–31)
MCHC RBC AUTO-ENTMCNC: 31.3 G/DL (ref 32–36)
MCV RBC AUTO: 90 FL (ref 82–98)
METHADONE UR QL SCN>300 NG/ML: NEGATIVE
MONOCYTES # BLD AUTO: 0.4 K/UL (ref 0.3–1)
MONOCYTES NFR BLD: 3.8 % (ref 4–15)
NEUTROPHILS # BLD AUTO: 6.7 K/UL (ref 1.8–7.7)
NEUTROPHILS NFR BLD: 70.2 % (ref 38–73)
NITRITE UR QL STRIP: NEGATIVE
NRBC BLD-RTO: 0 /100 WBC
OPIATES UR QL SCN: NEGATIVE
PCP UR QL SCN>25 NG/ML: NEGATIVE
PH UR STRIP: 5 [PH] (ref 5–8)
PLATELET # BLD AUTO: 382 K/UL (ref 150–350)
PMV BLD AUTO: 8.9 FL (ref 9.2–12.9)
POTASSIUM SERPL-SCNC: 4.3 MMOL/L (ref 3.5–5.1)
PROT SERPL-MCNC: 8.1 G/DL (ref 6–8.4)
PROT UR QL STRIP: NEGATIVE
RBC # BLD AUTO: 4.66 M/UL (ref 4–5.4)
SODIUM SERPL-SCNC: 143 MMOL/L (ref 136–145)
SP GR UR STRIP: 1 (ref 1–1.03)
T4 FREE SERPL-MCNC: 1.18 NG/DL (ref 0.71–1.51)
TOXICOLOGY INFORMATION: NORMAL
TSH SERPL DL<=0.005 MIU/L-ACNC: 0.33 UIU/ML (ref 0.4–4)
URN SPEC COLLECT METH UR: ABNORMAL
UROBILINOGEN UR STRIP-ACNC: NEGATIVE EU/DL
WBC # BLD AUTO: 9.58 K/UL (ref 3.9–12.7)

## 2020-02-01 PROCEDURE — 80320 DRUG SCREEN QUANTALCOHOLS: CPT

## 2020-02-01 PROCEDURE — 99285 EMERGENCY DEPT VISIT HI MDM: CPT

## 2020-02-01 PROCEDURE — 84439 ASSAY OF FREE THYROXINE: CPT

## 2020-02-01 PROCEDURE — 80320 DRUG SCREEN QUANTALCOHOLS: CPT | Mod: 91

## 2020-02-01 PROCEDURE — 81003 URINALYSIS AUTO W/O SCOPE: CPT | Mod: 59

## 2020-02-01 PROCEDURE — 80307 DRUG TEST PRSMV CHEM ANLYZR: CPT

## 2020-02-01 PROCEDURE — 80053 COMPREHEN METABOLIC PANEL: CPT

## 2020-02-01 PROCEDURE — 84443 ASSAY THYROID STIM HORMONE: CPT

## 2020-02-01 PROCEDURE — 80329 ANALGESICS NON-OPIOID 1 OR 2: CPT

## 2020-02-01 PROCEDURE — 85025 COMPLETE CBC W/AUTO DIFF WBC: CPT

## 2020-02-01 PROCEDURE — 81025 URINE PREGNANCY TEST: CPT | Performed by: EMERGENCY MEDICINE

## 2020-02-01 RX ORDER — OLANZAPINE 10 MG/1
10 TABLET ORAL DAILY
Status: DISCONTINUED | OUTPATIENT
Start: 2020-02-01 | End: 2020-02-01 | Stop reason: HOSPADM
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
## 2020-02-01 NOTE — PROVIDER PROGRESS NOTES - EMERGENCY DEPT.
Encounter Date: 2/1/2020    ED Physician Progress Notes        Physician Note:   Patient has been sleeping with no complaints.  Signed out to me pending repeat alcohol level.  Repeat alcohol level in the 70s.  Medically cleared.  Dennis Noble

## 2020-02-02 PROBLEM — F32.A FATIGUE DUE TO DEPRESSION: Status: ACTIVE | Noted: 2020-02-02

## 2020-02-02 PROBLEM — Z13.9 ENCOUNTER FOR MEDICAL SCREENING EXAMINATION: Status: ACTIVE | Noted: 2020-02-02

## 2020-02-02 PROBLEM — R53.83 FATIGUE DUE TO DEPRESSION: Status: ACTIVE | Noted: 2020-02-02

## 2020-05-04 PROBLEM — Z13.9 ENCOUNTER FOR MEDICAL SCREENING EXAMINATION: Status: RESOLVED | Noted: 2020-02-02 | Resolved: 2020-05-04
